# Patient Record
Sex: MALE | Race: WHITE | Employment: FULL TIME | URBAN - METROPOLITAN AREA
[De-identification: names, ages, dates, MRNs, and addresses within clinical notes are randomized per-mention and may not be internally consistent; named-entity substitution may affect disease eponyms.]

---

## 2018-06-27 ENCOUNTER — HOSPITAL ENCOUNTER (EMERGENCY)
Age: 38
Discharge: HOME | End: 2018-06-27
Payer: COMMERCIAL

## 2018-06-27 VITALS
TEMPERATURE: 98.2 F | RESPIRATION RATE: 16 BRPM | OXYGEN SATURATION: 100 % | BODY MASS INDEX: 26.87 KG/M2 | DIASTOLIC BLOOD PRESSURE: 82 MMHG | HEART RATE: 68 BPM | SYSTOLIC BLOOD PRESSURE: 127 MMHG | WEIGHT: 215 LBS

## 2018-06-27 DIAGNOSIS — S05.8X1A ABRASION OF SCLERA OF RIGHT EYE, INITIAL ENCOUNTER: Primary | ICD-10-CM

## 2018-06-27 PROCEDURE — 99212 OFFICE O/P EST SF 10 MIN: CPT

## 2018-06-27 PROCEDURE — 6370000000 HC RX 637 (ALT 250 FOR IP): Performed by: PHYSICIAN ASSISTANT

## 2018-06-27 RX ADMIN — FLUORESCEIN SODIUM 1 MG: 0.6 STRIP OPHTHALMIC at 09:46

## 2020-12-16 ENCOUNTER — APPOINTMENT (OUTPATIENT)
Dept: CT IMAGING | Age: 40
DRG: 177 | End: 2020-12-16
Payer: COMMERCIAL

## 2020-12-16 ENCOUNTER — APPOINTMENT (OUTPATIENT)
Dept: ULTRASOUND IMAGING | Age: 40
DRG: 177 | End: 2020-12-16
Payer: COMMERCIAL

## 2020-12-16 ENCOUNTER — HOSPITAL ENCOUNTER (INPATIENT)
Age: 40
LOS: 2 days | Discharge: HOME | DRG: 177 | End: 2020-12-18
Attending: EMERGENCY MEDICINE | Admitting: INTERNAL MEDICINE
Payer: COMMERCIAL

## 2020-12-16 DIAGNOSIS — M54.50 ACUTE RIGHT-SIDED LOW BACK PAIN, UNSPECIFIED WHETHER SCIATICA PRESENT: ICD-10-CM

## 2020-12-16 DIAGNOSIS — I26.99 PULMONARY EMBOLISM, BILATERAL (HCC): Primary | ICD-10-CM

## 2020-12-16 LAB
ABO/RH: NORMAL
ALBUMIN SERPL-MCNC: 4.1 G/DL (ref 3.5–5.2)
ALP BLD-CCNC: 61 U/L (ref 40–129)
ALT SERPL-CCNC: 20 U/L (ref 0–40)
ANION GAP SERPL CALCULATED.3IONS-SCNC: 10 MMOL/L (ref 7–16)
ANTIBODY SCREEN: NORMAL
APTT: 27 SEC (ref 24.5–35.1)
APTT: 77.9 SEC (ref 24.5–35.1)
AST SERPL-CCNC: 17 U/L (ref 0–39)
BASOPHILS ABSOLUTE: 0.03 E9/L (ref 0–0.2)
BASOPHILS RELATIVE PERCENT: 0.2 % (ref 0–2)
BILIRUB SERPL-MCNC: 0.6 MG/DL (ref 0–1.2)
BUN BLDV-MCNC: 16 MG/DL (ref 6–20)
C-REACTIVE PROTEIN: 7.9 MG/DL (ref 0–0.4)
CALCIUM SERPL-MCNC: 9.3 MG/DL (ref 8.6–10.2)
CHLORIDE BLD-SCNC: 104 MMOL/L (ref 98–107)
CO2: 26 MMOL/L (ref 22–29)
CREAT SERPL-MCNC: 1 MG/DL (ref 0.7–1.2)
D DIMER: 813 NG/ML DDU
EKG ATRIAL RATE: 38 BPM
EKG Q-T INTERVAL: 364 MS
EKG QRS DURATION: 98 MS
EKG QTC CALCULATION (BAZETT): 411 MS
EKG R AXIS: 1 DEGREES
EKG T AXIS: 22 DEGREES
EKG VENTRICULAR RATE: 77 BPM
EOSINOPHILS ABSOLUTE: 0.03 E9/L (ref 0.05–0.5)
EOSINOPHILS RELATIVE PERCENT: 0.2 % (ref 0–6)
FERRITIN: 397 NG/ML
FIBRINOGEN: >700 MG/DL (ref 225–540)
GFR AFRICAN AMERICAN: >60
GFR NON-AFRICAN AMERICAN: >60 ML/MIN/1.73
GLUCOSE BLD-MCNC: 115 MG/DL (ref 74–99)
HCT VFR BLD CALC: 42.2 % (ref 37–54)
HEMOGLOBIN: 14.3 G/DL (ref 12.5–16.5)
IMMATURE GRANULOCYTES #: 0.08 E9/L
IMMATURE GRANULOCYTES %: 0.6 % (ref 0–5)
LACTATE DEHYDROGENASE: 156 U/L (ref 135–225)
LACTIC ACID: 1.6 MMOL/L (ref 0.5–2.2)
LYMPHOCYTES ABSOLUTE: 1 E9/L (ref 1.5–4)
LYMPHOCYTES RELATIVE PERCENT: 7.4 % (ref 20–42)
MCH RBC QN AUTO: 31.2 PG (ref 26–35)
MCHC RBC AUTO-ENTMCNC: 33.9 % (ref 32–34.5)
MCV RBC AUTO: 92.1 FL (ref 80–99.9)
MONOCYTES ABSOLUTE: 1.26 E9/L (ref 0.1–0.95)
MONOCYTES RELATIVE PERCENT: 9.3 % (ref 2–12)
NEUTROPHILS ABSOLUTE: 11.12 E9/L (ref 1.8–7.3)
NEUTROPHILS RELATIVE PERCENT: 82.3 % (ref 43–80)
PDW BLD-RTO: 12.2 FL (ref 11.5–15)
PLATELET # BLD: 307 E9/L (ref 130–450)
PMV BLD AUTO: 8.9 FL (ref 7–12)
POTASSIUM REFLEX MAGNESIUM: 4.7 MMOL/L (ref 3.5–5)
PROCALCITONIN: 0.09 NG/ML (ref 0–0.08)
RBC # BLD: 4.58 E12/L (ref 3.8–5.8)
SEDIMENTATION RATE, ERYTHROCYTE: 59 MM/HR (ref 0–15)
SODIUM BLD-SCNC: 140 MMOL/L (ref 132–146)
TOTAL PROTEIN: <0.2 G/DL (ref 6.4–8.3)
TROPONIN: <0.01 NG/ML (ref 0–0.03)
VITAMIN D 25-HYDROXY: 22 NG/ML (ref 30–100)
WBC # BLD: 13.5 E9/L (ref 4.5–11.5)

## 2020-12-16 PROCEDURE — 36415 COLL VENOUS BLD VENIPUNCTURE: CPT

## 2020-12-16 PROCEDURE — 96375 TX/PRO/DX INJ NEW DRUG ADDON: CPT

## 2020-12-16 PROCEDURE — 85651 RBC SED RATE NONAUTOMATED: CPT

## 2020-12-16 PROCEDURE — 82306 VITAMIN D 25 HYDROXY: CPT

## 2020-12-16 PROCEDURE — 85730 THROMBOPLASTIN TIME PARTIAL: CPT

## 2020-12-16 PROCEDURE — 6360000002 HC RX W HCPCS: Performed by: STUDENT IN AN ORGANIZED HEALTH CARE EDUCATION/TRAINING PROGRAM

## 2020-12-16 PROCEDURE — 1200000000 HC SEMI PRIVATE

## 2020-12-16 PROCEDURE — 86140 C-REACTIVE PROTEIN: CPT

## 2020-12-16 PROCEDURE — 84484 ASSAY OF TROPONIN QUANT: CPT

## 2020-12-16 PROCEDURE — 86901 BLOOD TYPING SEROLOGIC RH(D): CPT

## 2020-12-16 PROCEDURE — 83605 ASSAY OF LACTIC ACID: CPT

## 2020-12-16 PROCEDURE — 85384 FIBRINOGEN ACTIVITY: CPT

## 2020-12-16 PROCEDURE — 85378 FIBRIN DEGRADE SEMIQUANT: CPT

## 2020-12-16 PROCEDURE — 0202U NFCT DS 22 TRGT SARS-COV-2: CPT

## 2020-12-16 PROCEDURE — 82728 ASSAY OF FERRITIN: CPT

## 2020-12-16 PROCEDURE — 96374 THER/PROPH/DIAG INJ IV PUSH: CPT

## 2020-12-16 PROCEDURE — 6360000004 HC RX CONTRAST MEDICATION: Performed by: RADIOLOGY

## 2020-12-16 PROCEDURE — 83615 LACTATE (LD) (LDH) ENZYME: CPT

## 2020-12-16 PROCEDURE — 6360000002 HC RX W HCPCS: Performed by: INTERNAL MEDICINE

## 2020-12-16 PROCEDURE — 2580000003 HC RX 258: Performed by: INTERNAL MEDICINE

## 2020-12-16 PROCEDURE — 6370000000 HC RX 637 (ALT 250 FOR IP): Performed by: INTERNAL MEDICINE

## 2020-12-16 PROCEDURE — 93010 ELECTROCARDIOGRAM REPORT: CPT | Performed by: INTERNAL MEDICINE

## 2020-12-16 PROCEDURE — 84145 PROCALCITONIN (PCT): CPT

## 2020-12-16 PROCEDURE — 86900 BLOOD TYPING SEROLOGIC ABO: CPT

## 2020-12-16 PROCEDURE — 83520 IMMUNOASSAY QUANT NOS NONAB: CPT

## 2020-12-16 PROCEDURE — 93970 EXTREMITY STUDY: CPT

## 2020-12-16 PROCEDURE — 87449 NOS EACH ORGANISM AG IA: CPT

## 2020-12-16 PROCEDURE — 99285 EMERGENCY DEPT VISIT HI MDM: CPT

## 2020-12-16 PROCEDURE — 86850 RBC ANTIBODY SCREEN: CPT

## 2020-12-16 PROCEDURE — 85025 COMPLETE CBC W/AUTO DIFF WBC: CPT

## 2020-12-16 PROCEDURE — 80053 COMPREHEN METABOLIC PANEL: CPT

## 2020-12-16 PROCEDURE — 71275 CT ANGIOGRAPHY CHEST: CPT

## 2020-12-16 PROCEDURE — 93005 ELECTROCARDIOGRAM TRACING: CPT | Performed by: STUDENT IN AN ORGANIZED HEALTH CARE EDUCATION/TRAINING PROGRAM

## 2020-12-16 RX ORDER — AZITHROMYCIN 250 MG/1
500 TABLET, FILM COATED ORAL DAILY
Status: DISCONTINUED | OUTPATIENT
Start: 2020-12-17 | End: 2020-12-18 | Stop reason: HOSPADM

## 2020-12-16 RX ORDER — HEPARIN SODIUM 1000 [USP'U]/ML
80 INJECTION, SOLUTION INTRAVENOUS; SUBCUTANEOUS ONCE
Status: COMPLETED | OUTPATIENT
Start: 2020-12-16 | End: 2020-12-16

## 2020-12-16 RX ORDER — HEPARIN SODIUM 1000 [USP'U]/ML
80 INJECTION, SOLUTION INTRAVENOUS; SUBCUTANEOUS PRN
Status: DISCONTINUED | OUTPATIENT
Start: 2020-12-16 | End: 2020-12-18 | Stop reason: HOSPADM

## 2020-12-16 RX ORDER — FENTANYL CITRATE 50 UG/ML
75 INJECTION, SOLUTION INTRAMUSCULAR; INTRAVENOUS ONCE
Status: COMPLETED | OUTPATIENT
Start: 2020-12-16 | End: 2020-12-16

## 2020-12-16 RX ORDER — FENTANYL CITRATE 50 UG/ML
50 INJECTION, SOLUTION INTRAMUSCULAR; INTRAVENOUS ONCE
Status: DISCONTINUED | OUTPATIENT
Start: 2020-12-16 | End: 2020-12-18 | Stop reason: HOSPADM

## 2020-12-16 RX ORDER — ASCORBIC ACID 500 MG
1000 TABLET ORAL DAILY
Status: DISCONTINUED | OUTPATIENT
Start: 2020-12-16 | End: 2020-12-18 | Stop reason: HOSPADM

## 2020-12-16 RX ORDER — POLYETHYLENE GLYCOL 3350 17 G/17G
17 POWDER, FOR SOLUTION ORAL DAILY PRN
Status: DISCONTINUED | OUTPATIENT
Start: 2020-12-16 | End: 2020-12-18 | Stop reason: HOSPADM

## 2020-12-16 RX ORDER — HEPARIN SODIUM 1000 [USP'U]/ML
40 INJECTION, SOLUTION INTRAVENOUS; SUBCUTANEOUS PRN
Status: DISCONTINUED | OUTPATIENT
Start: 2020-12-16 | End: 2020-12-18 | Stop reason: HOSPADM

## 2020-12-16 RX ORDER — ALLOPURINOL 300 MG/1
300 TABLET ORAL DAILY
COMMUNITY

## 2020-12-16 RX ORDER — HEPARIN SODIUM 10000 [USP'U]/100ML
18 INJECTION, SOLUTION INTRAVENOUS CONTINUOUS
Status: DISCONTINUED | OUTPATIENT
Start: 2020-12-16 | End: 2020-12-18 | Stop reason: HOSPADM

## 2020-12-16 RX ORDER — OXYCODONE HYDROCHLORIDE AND ACETAMINOPHEN 5; 325 MG/1; MG/1
1 TABLET ORAL EVERY 8 HOURS PRN
Status: DISCONTINUED | OUTPATIENT
Start: 2020-12-16 | End: 2020-12-18 | Stop reason: HOSPADM

## 2020-12-16 RX ORDER — VITAMIN B COMPLEX
2000 TABLET ORAL DAILY
Status: DISCONTINUED | OUTPATIENT
Start: 2020-12-16 | End: 2020-12-17

## 2020-12-16 RX ORDER — ACETAMINOPHEN 650 MG/1
650 SUPPOSITORY RECTAL EVERY 6 HOURS PRN
Status: DISCONTINUED | OUTPATIENT
Start: 2020-12-16 | End: 2020-12-18 | Stop reason: HOSPADM

## 2020-12-16 RX ORDER — GAUZE BANDAGE 2" X 2"
100 BANDAGE TOPICAL DAILY
Status: DISCONTINUED | OUTPATIENT
Start: 2020-12-16 | End: 2020-12-18 | Stop reason: HOSPADM

## 2020-12-16 RX ORDER — SODIUM CHLORIDE 0.9 % (FLUSH) 0.9 %
10 SYRINGE (ML) INJECTION PRN
Status: DISCONTINUED | OUTPATIENT
Start: 2020-12-16 | End: 2020-12-18 | Stop reason: HOSPADM

## 2020-12-16 RX ORDER — LANOLIN ALCOHOL/MO/W.PET/CERES
50 CREAM (GRAM) TOPICAL DAILY
Status: DISCONTINUED | OUTPATIENT
Start: 2020-12-16 | End: 2020-12-18 | Stop reason: HOSPADM

## 2020-12-16 RX ORDER — SODIUM CHLORIDE 0.9 % (FLUSH) 0.9 %
10 SYRINGE (ML) INJECTION EVERY 12 HOURS SCHEDULED
Status: DISCONTINUED | OUTPATIENT
Start: 2020-12-16 | End: 2020-12-18 | Stop reason: HOSPADM

## 2020-12-16 RX ORDER — ACETAMINOPHEN 325 MG/1
650 TABLET ORAL EVERY 6 HOURS PRN
Status: DISCONTINUED | OUTPATIENT
Start: 2020-12-16 | End: 2020-12-18 | Stop reason: HOSPADM

## 2020-12-16 RX ORDER — ZINC SULFATE 50(220)MG
50 CAPSULE ORAL DAILY
Status: DISCONTINUED | OUTPATIENT
Start: 2020-12-16 | End: 2020-12-18 | Stop reason: HOSPADM

## 2020-12-16 RX ADMIN — SODIUM CHLORIDE, PRESERVATIVE FREE 10 ML: 5 INJECTION INTRAVENOUS at 20:42

## 2020-12-16 RX ADMIN — PYRIDOXINE HCL TAB 50 MG 50 MG: 50 TAB at 18:48

## 2020-12-16 RX ADMIN — IOPAMIDOL 75 ML: 755 INJECTION, SOLUTION INTRAVENOUS at 13:03

## 2020-12-16 RX ADMIN — OXYCODONE HYDROCHLORIDE AND ACETAMINOPHEN 1000 MG: 500 TABLET ORAL at 18:48

## 2020-12-16 RX ADMIN — HEPARIN SODIUM 18 UNITS/KG/HR: 10000 INJECTION, SOLUTION INTRAVENOUS at 13:50

## 2020-12-16 RX ADMIN — HEPARIN SODIUM 7940 UNITS: 1000 INJECTION INTRAVENOUS; SUBCUTANEOUS at 13:50

## 2020-12-16 RX ADMIN — ZINC SULFATE 220 MG (50 MG) CAPSULE 50 MG: CAPSULE at 18:48

## 2020-12-16 RX ADMIN — AZITHROMYCIN DIHYDRATE 500 MG: 500 INJECTION, POWDER, LYOPHILIZED, FOR SOLUTION INTRAVENOUS at 20:41

## 2020-12-16 RX ADMIN — Medication 100 MG: at 18:48

## 2020-12-16 RX ADMIN — FENTANYL CITRATE 75 MCG: 50 INJECTION INTRAMUSCULAR; INTRAVENOUS at 10:55

## 2020-12-16 RX ADMIN — WATER 1 G: 1 INJECTION INTRAMUSCULAR; INTRAVENOUS; SUBCUTANEOUS at 20:41

## 2020-12-16 RX ADMIN — Medication 2000 UNITS: at 18:47

## 2020-12-16 ASSESSMENT — ENCOUNTER SYMPTOMS
SINUS PRESSURE: 0
ABDOMINAL PAIN: 0
COUGH: 0
EYE REDNESS: 0
EYE DISCHARGE: 0
BACK PAIN: 1
DIARRHEA: 0
ABDOMINAL DISTENTION: 0
NAUSEA: 0
CONSTIPATION: 0
SHORTNESS OF BREATH: 1
VOMITING: 0
WHEEZING: 0
EYE PAIN: 0
SORE THROAT: 0

## 2020-12-16 ASSESSMENT — PAIN SCALES - GENERAL: PAINLEVEL_OUTOF10: 10

## 2020-12-16 NOTE — CARE COORDINATION
Emergency Department Social Work Assessment:    Pt presents to the ED with back pain and COVID19. SW met with pt in room to discuss transition of care and complete ACP. Pt was alert/oriented, stated that he lives with his ezekiel Cruz ([phone]), who is he would want to be his primary decision maker. SW discussed the benefits of Advanced Directives, he was receptive to the information but did not want to complete paperwork today. Pt reported that he works full time, was independent PTA, plans on returning home when medically stable, no discharge needs identified. ACP completed. Assigned SW/CM to follow.     SEFERINO Martinez, [address] Emergency Department

## 2020-12-16 NOTE — H&P
Department of Internal Medicine  History and Physical    PCP: Dr. Nuria Ribeiro    Admitting Physician: Dr. Jodi Hayes  Consultants:       CHIEF COMPLAINT: Right-sided flank pain    HISTORY OF PRESENT ILLNESS:    Patient is 40-year-old male who presented to the ED due to right sided flank pain. Patient states that he had this for the last few days. Due to the pain he does have issues with his breathing. He is not able to take deep breaths without pain. Patient recently tested positive for COVID-19 on 12/8/2020. His wife had contracted COVID-19 and as a result he was tested. He denies any symptoms from COVID-19. He denies fever or chills. Denies nausea or vomiting. He denies loss of smell or taste. He denies any lower extremity or upper extremity swelling. He denies chest pain. Family history is significant for coronary artery disease. His father had CABG in his 46s. He does chew tobacco.  He does have a history of tobacco abuse. He does occasionally drink alcohol. Denies any recent travel or procedures. PAST MEDICAL Hx:  History reviewed. No pertinent past medical history. PAST SURGICAL Hx:   History reviewed. No pertinent surgical history. FAMILY Hx:  History reviewed. No pertinent family history. HOME MEDICATIONS:  Prior to Admission medications    Medication Sig Start Date End Date Taking? Authorizing Provider   allopurinol (ZYLOPRIM) 300 MG tablet Take 300 mg by mouth daily   Yes Historical Provider, MD       ALLERGIES:  Patient has no known allergies.     SOCIAL Hx:  Social History     Socioeconomic History    Marital status: Single     Spouse name: Not on file    Number of children: Not on file    Years of education: Not on file    Highest education level: Not on file   Occupational History    Not on file   Social Needs    Financial resource strain: Not on file    Food insecurity     Worry: Not on file     Inability: Not on file    Transportation needs     Medical: Not on file Non-medical: Not on file   Tobacco Use    Smoking status: Former Smoker     Packs/day: 0.50     Quit date: 2016     Years since quittin.4   Substance and Sexual Activity    Alcohol use: Yes     Comment: occ    Drug use: No    Sexual activity: Not on file   Lifestyle    Physical activity     Days per week: Not on file     Minutes per session: Not on file    Stress: Not on file   Relationships    Social connections     Talks on phone: Not on file     Gets together: Not on file     Attends Worship service: Not on file     Active member of club or organization: Not on file     Attends meetings of clubs or organizations: Not on file     Relationship status: Not on file    Intimate partner violence     Fear of current or ex partner: Not on file     Emotionally abused: Not on file     Physically abused: Not on file     Forced sexual activity: Not on file   Other Topics Concern    Not on file   Social History Narrative    Not on file       ROS: Positive in bold  General:   Denies chills, fatigue, fever, malaise, night sweats or weight loss    Psychological:   Denies anxiety, disorientation or hallucinations    ENT:    Denies epistaxis, headaches, vertigo or visual changes    Cardiovascular:   Denies any chest pain, irregular heartbeats, or palpitations. No paroxysmal nocturnal dyspnea. Respiratory:   Denies shortness of breath, coughing, sputum production, hemoptysis, or wheezing. No orthopnea. Gastrointestinal:   Denies nausea, vomiting, diarrhea, or constipation. Denies any abdominal pain. Denies change in bowel habits or stools. Genito-Urinary:    Denies any urgency, frequency, hematuria. Voiding without difficulty. Musculoskeletal:   Denies joint pain, joint stiffness, joint swelling or muscle pain    Neurology:    Denies any headache or focal neurological deficits. No weakness or paresthesia. Derm:    Denies any rashes, ulcers, or excoriations. Denies bruising. Extremities:   Denies any lower extremity swelling or edema. PHYSICAL EXAM:  VITALS:  Vitals:    12/16/20 1603   BP: (!) 143/77   Pulse: 86   Resp: 22   Temp:    SpO2: 97%         CONSTITUTIONAL:    Awake, alert, cooperative, no apparent distress, and appears stated age    EYES:    PERRL, EOMI, sclera clear, conjunctiva normal    ENT:    Normocephalic, atraumatic, sinuses nontender on palpation. External ears without lesions. Oral pharynx with moist mucus membranes. Tonsils without erythema or exudates. NECK:    Supple, symmetrical, trachea midline, no adenopathy, thyroid symmetric, not enlarged and no tenderness, skin normal, no bruits, no JVD    HEMATOLOGIC/LYMPHATICS:    No cervical lymphadenopathy and no supraclavicular lymphadenopathy    LUNGS:    Symmetric. No increased work of breathing, good air exchange, clear to auscultation bilaterally, no wheezes, rhonchi, or rales,     CARDIOVASCULAR:    Normal apical impulse, regular rate and rhythm, normal S1 and S2, no S3 or S4, and no murmur noted    ABDOMEN:    No scars, normal bowel sounds, soft, non-distended, non-tender, no masses palpated, no hepatosplenomegaly, no rebound or guarding elicited on palpation     MUSCULOSKELETAL:    There is no redness, warmth, or swelling of the joints. Full range of motion noted. Motor strength is 5 out of 5 all extremities bilaterally. Tone is normal.    NEUROLOGIC:    Awake, alert, oriented to name, place and time. Cranial nerves II-XII are grossly intact. Motor is 5 out of 5 bilaterally. SKIN:    No bruising or bleeding. No redness, warmth, or swelling    EXTREMITIES:    Peripheral pulses present. No edema, cyanosis, or swelling. OSTEOPATHIC:    Examined in seated and supine positions. Normal thoracic kyphosis and lumbar lordosis. No acute somatic dysfunction.     LINES/CATHETERS     LABORATORY DATA:  CBC with Differential:    Lab Results   Component Value Date    WBC 13.5 12/16/2020    RBC 4.58 12/16/2020    HGB 14.3 12/16/2020    HCT 42.2 12/16/2020     12/16/2020    MCV 92.1 12/16/2020    MCH 31.2 12/16/2020    MCHC 33.9 12/16/2020    RDW 12.2 12/16/2020    LYMPHOPCT 7.4 12/16/2020    MONOPCT 9.3 12/16/2020    BASOPCT 0.2 12/16/2020    MONOSABS 1.26 12/16/2020    LYMPHSABS 1.00 12/16/2020    EOSABS 0.03 12/16/2020    BASOSABS 0.03 12/16/2020     CMP:    Lab Results   Component Value Date     12/16/2020    K 4.7 12/16/2020     12/16/2020    CO2 26 12/16/2020    BUN 16 12/16/2020    CREATININE 1.0 12/16/2020    GFRAA >60 12/16/2020    LABGLOM >60 12/16/2020    GLUCOSE 115 12/16/2020    PROT <0.2 12/16/2020    LABALBU 4.1 12/16/2020    CALCIUM 9.3 12/16/2020    BILITOT 0.6 12/16/2020    ALKPHOS 61 12/16/2020    AST 17 12/16/2020    ALT 20 12/16/2020       ASSESSMENT/PLAN:  1. Bilateral PE most notable in lower lobes with concern for right heart strain. Likely provoked by COVID-19 infection. 1. Patient started on heparin drip. He can be transitioned to Eliquis or Xarelto. Echocardiogram will be obtained. Bilateral lower extremity ultrasound will be obtained. Hematology and oncology will be consulted. 2. COVID-19  1. Patient was diagnosed on 12/8/2020. Overall he is asymptomatic except for pain on the right side with breathing deeply. We will place him on supplements. Will obtain COVID-19 biomarkers. On room air he is saturating in normal limits. Check his saturations on ambulation. 3. Possible pneumonia bacterial versus viral  1. Obtain cultures. Start on antibiotics for now. 4. History of tobacco abuse. 5. Current use of chewing tobacco  6. Family history of premature coronary artery disease          Brandi Bhatt D.O.  5:33 PM  12/16/2020    Electronically signed by Brandi Bhatt DO on 12/16/20 at 5:33 PM EST  Telehealth visit completed via physician liaison, Dr. Annel Thurston,  who personally saw and examined the patient.  I personally participated in the case including review of pertinent history as augmented in the above medical record and medical decision making on the date of service and I agree with all pertinent clinical formation unless otherwise noted.     Mark Paulson D.O., [email or web address].  12/16/2020  6:42 PM

## 2020-12-16 NOTE — ACP (ADVANCE CARE PLANNING)
Advance Care Planning     Advance Care Planning Activator (Inpatient)  Conversation Note      Date of ACP Conversation: 12/16/2020    Conversation Conducted with: Patient with Decision Making Capacity    ACP Activator: [id number] makes decisions on behalf of the incapacitated patient: Decision Maker is asked to consider and make decisions based on patient values, known preferences, or best interests. Health Care Decision Maker:     Current Designated Health Care Decision Maker:   Primary Decision Maker: Sai Alejandro - Other - [phone]  (If there is a valid Health Care Decision Maker named in the \"Healthcare Decision Makers\" box in the ACP activity, but it is not visible above, be sure to open that field and then select the health care decision maker relationship (ie \"primary\") in the blank space to the right of the name.) Validate  this information as still accurate & up-to-date; edit [place] field as needed.)      Care Preferences    Ventilation: \"If you were in your present state of health and suddenly became very ill and were unable to breathe on your own, what would your preference be about the use of a ventilator (breathing machine) if it were available to you? \"      Would the patient desire the use of ventilator (breathing machine)?: yes    \"If your health worsens and it becomes clear that your chance of recovery is unlikely, what would your preference be about the use of a ventilator (breathing machine) if it were available to you? \"     Would the patient desire the use of ventilator (breathing machine)?: Yes      Resuscitation  \"CPR works best to restart the heart when there is a sudden event, like a heart attack, in someone who is otherwise healthy. Unfortunately, CPR does not typically restart the heart for people who have serious health conditions or who are very sick. \"    \"In the event your heart stopped as a result of an underlying serious health condition, would you want attempts to be made to restart your heart (answer \"yes\" for attempt to resuscitate) or would you prefer a natural death (answer \"no\" for do not attempt to resuscitate)? \" yes      NOTE: If the patient has a valid advance directive AND now provides care preference(s) that are inconsistent with that prior directive, advise the patient to consider either: creating a new advance directive that complies with state-specific requirements; or, if that is not possible, orally revoking that prior directive in accordance with state-specific requirements, which must be documented in the EHR. [] Yes   [x] No   Educated Patient / Tsjeffreye Yesseniaa regarding differences between Advance Directives and portable DNR orders.     Length of ACP Conversation in minutes:      Conversation Outcomes:  [x] ACP discussion completed  [] Existing advance directive reviewed with patient; no changes to patient's previously recorded wishes  [] New Advance Directive completed  [] Portable Do Not Rescitate prepared for Provider review and signature  [] POLST/POST/MOLST/MOST prepared for Provider review and signature      Follow-up plan:    [x] Schedule follow-up conversation to continue planning  [] Referred individual to Provider for additional questions/concerns   [] Advised patient/agent/surrogate to review completed ACP document and update if needed with changes in condition, patient preferences or care setting    [x] This note routed to one or more involved healthcare providers       SEFERINO Gunter, [address] Emergency Department

## 2020-12-16 NOTE — ED PROVIDER NOTES
39-year-old male positive Covid history presents the ED with complaint of right lower quadrant back pain from his primary care doctor's office Dr. Martin Prado. Patient did receive Toradol while at his primary care doctor's office however patient is still complaining of pain. Patient states that the lower back pain for approximately 3 days described as constant and sharp. [place] has been worsening over the past 3 days nothing makes it  better , has not tried anything except for the Toradol he got at his primary care doctor's office. The pain does not radiate stays in the right lower back and does not move. States pain is worse with deep breathing in the right lower back. Dates he is short of breath due to the pain. Denies any chest pain, abdominal pain denies any changes to bowel or bladder habits denies any fevers, chills. The history is provided by the patient and medical records. Review of Systems   Constitutional: Negative for chills and fever. HENT: Negative for ear pain, sinus pressure and sore throat. Eyes: Negative for pain, discharge and redness. Respiratory: Positive for shortness of breath. Negative for cough and wheezing. Cardiovascular: Negative for chest pain. Gastrointestinal: Negative for abdominal distention, abdominal pain, constipation, diarrhea, nausea and vomiting. Genitourinary: Negative for dysuria and frequency. Musculoskeletal: Positive for back pain. Negative for arthralgias. Skin: Negative for rash and wound. Neurological: Negative for weakness and headaches. Hematological: Negative for adenopathy. All other systems reviewed and are negative. Physical Exam  Vitals signs and nursing note reviewed. Constitutional:       Appearance: He is well-developed. HENT:      Head: Normocephalic and atraumatic. Eyes:      General: No scleral icterus.      Conjunctiva/sclera: Conjunctivae normal.   Neck:      Musculoskeletal: Normal range of motion and neck supple. Cardiovascular:      Rate and Rhythm: Normal rate and regular rhythm. Heart sounds: Normal heart sounds. No murmur. Pulmonary:      Effort: Pulmonary effort is normal. No respiratory distress. Breath sounds: Normal breath sounds. No wheezing or rales. Abdominal:      General: Bowel sounds are normal. There is no distension. Palpations: Abdomen is soft. Tenderness: There is no abdominal tenderness. There is no right CVA tenderness, left CVA tenderness, guarding or rebound. Musculoskeletal:         General: No swelling, tenderness or deformity. Comments: No pain to palpation in the patient's right lower back or along his lumbar thoracic or cervical spine. No signs of any step-offs or deformities, no signs of any injury in the lower back. Skin:     General: Skin is warm and dry. Coloration: Skin is not jaundiced. Neurological:      General: No focal deficit present. Mental Status: He is alert and oriented to person, place, and time. Cranial Nerves: No cranial nerve deficit. Coordination: Coordination normal.   Psychiatric:         Mood and Affect: Mood normal.         Thought Content: Thought content normal.          Procedures     LIMITED ABDOMINAL BEDSIDE ULTRASOUND     Risks, benefits and alternatives (for applicable procedures below) described. Performed By: Vikas Gabriel MD.    Indication:  Pain. Informed consent: The patient provided verbal consent for this procedure. .  Procedural Quadrants:     Ultrasound of the patient's right kidney revealed no acute hydronephrosis         MDM  Number of Diagnoses or Management Options  Acute right-sided low back pain, unspecified whether sciatica present  Pulmonary embolism, bilateral ([address]  Diagnosis management comments: 13-year-old male Covid positive presents ED with complaint of right lower back pain sent in from Dr. Nuria Ribeiro office. Patient was given IM Toradol and Dr. Irene Villatoro office as well.   She had no complaint of chest pain or shortness of breath but did have pain with deep inspiration. Due to this did get a CT scan of the patient's chest along with cardiac work-up. Patient did have improved pain control following fentanyl here in the emergency department today but the Toradol given at Dr. Martin Prado office did not help his pain. Did also perform ultrasound of patient's right-sided kidney check for possible hydronephrosis, no hydronephrosis seen on ultrasound. Patient's EKG was unremarkable for any acute ST elevations or depressions, troponin was within normal limits low concern cardiac etiology at this point. Remaining lab work was unremarkable. Patient's CAT scan check for possible pulmonary embolism did reveal bilateral PEs with possible right heart strain. Due to this decision was made to admit the patient. Was agreeable to be admitted this time. Did also start the patient on heparin while here in the emergency department. Patient remained hemodynamically stable while here in the department. ED Course as of Dec 16 1331   Wed Dec 16, 2020   1113   ATTENDING PROVIDER ATTESTATION:     I have personally performed and/or participated in the history, exam, medical decision making, and procedures and agree with all pertinent clinical information unless otherwise noted. I have also reviewed and agree with the past medical, family and social history unless otherwise noted. I have discussed this patient in detail with the resident and provided the instruction and education regarding the evidence-based evaluation and treatment of [unfilled]  History: patient presents from Dr Martin Prado office. He has COVID and was being evaluated. Prior to leaving he developed right sided abdominal pain/lower posterior chest wall pain and began to cry. Dr Martin Prado gave him Toradol 60 mg. Patient states no improvement in symptoms. It hurts to take a deep breath. My findings: Dennis Pollard is a 41 y.o. male whom is in mild distress. Physical exam reveals heart RRR, lungs CTA, abdomen is soft and nontender. No CVA tenderness. No rash appreciated. No peripheral edema or tenderness. My plan: Symptomatic and supportive care. Patient to have CTA and further evaluation. Electronically signed by Jenny Bradford DO on 12/16/20 at 11:13 AM EST        [JS]   1330 Updated patient on his lab work and his imaging showing bilateral pulmonary embolisms. Need for admission due to this and was started on heparin as well patient was agreeable this plan moving forward.    [CB]      ED Course User Index  [CB] Rabia Hernandez MD  [JS] Jenny Bradford DO        EKG: This EKG is signed by emergency department physician. Rate: 77  Rhythm: Sinus  Interpretation: No previous EKG to compare to, normal sinus rhythm, no acute ST elevations or depressions, normal axis  Comparison: no previous EKG available       ED Course as of Dec 16 1331   Wed Dec 16, 2020   1113   ATTENDING PROVIDER ATTESTATION:     I have personally performed and/or participated in the history, exam, medical decision making, and procedures and agree with all pertinent clinical information unless otherwise noted. I have also reviewed and agree with the past medical, family and social history unless otherwise noted. I have discussed this patient in detail with the resident and provided the instruction and education regarding the evidence-based evaluation and treatment of [unfilled]  History: patient presents from Dr Keisha Christensen office. He has COVID and was being evaluated. Prior to leaving he developed right sided abdominal pain/lower posterior chest wall pain and began to cry. Dr Keisha Christensen gave him Toradol 60 mg. Patient states no improvement in symptoms. It hurts to take a deep breath. My findings: Roberto Martínez is a 41 y.o. male whom is in mild distress.  Physical exam reveals heart RRR, lungs CTA, abdomen is soft and nontender. No CVA tenderness. No rash appreciated. No peripheral edema or tenderness. My plan: Symptomatic and supportive care. Patient to have CTA and further evaluation. Electronically signed by Jet Salamanca DO on 12/16/20 at 11:13 AM EST        [JS]   0617 Updated patient on his lab work and his imaging showing bilateral pulmonary embolisms. Need for admission due to this and was started on heparin as well patient was agreeable this plan moving forward.    [CB]      ED Course User Index  [CB] Mariana Calhoun MD  [JS] Jet Salamanca DO       --------------------------------------------- PAST HISTORY ---------------------------------------------  Past Medical History:  has no past medical history on file. Past Surgical History:  has no past surgical history on file. Social History:  reports that he quit smoking about 4 years ago. He smoked 0.50 packs per day. He does not have any smokeless tobacco history on file. He reports current alcohol use. He reports that he does not use drugs. Family History: family history is not on file. The patients home medications have been reviewed. Allergies: Patient has no known allergies.     -------------------------------------------------- RESULTS -------------------------------------------------    LABS:  Results for orders placed or performed during the hospital encounter of 12/16/20   CBC Auto Differential   Result Value Ref Range    WBC 13.5 (H) 4.5 - 11.5 E9/L    RBC 4.58 3.80 - 5.80 E12/L    Hemoglobin 14.3 12.5 - 16.5 g/dL    Hematocrit 42.2 37.0 - 54.0 %    MCV 92.1 80.0 - 99.9 fL    MCH 31.2 26.0 - 35.0 pg    MCHC 33.9 32.0 - 34.5 %    RDW 12.2 11.5 - 15.0 fL    Platelets 459 225 - 924 E9/L    MPV 8.9 7.0 - 12.0 fL    Neutrophils % 82.3 (H) 43.0 - 80.0 %    Immature Granulocytes % 0.6 0.0 - 5.0 %    Lymphocytes % 7.4 (L) 20.0 - 42.0 %    Monocytes % 9.3 2.0 - 12.0 %    Eosinophils % 0.2 0.0 - 6.0 %    Basophils % 0.2 0.0 - 2.0 %    Neutrophils Absolute 11.12 (H) 1.80 - 7.30 E9/L    Immature Granulocytes # 0.08 E9/L    Lymphocytes Absolute 1.00 (L) 1.50 - 4.00 E9/L    Monocytes Absolute 1.26 (H) 0.10 - 0.95 E9/L    Eosinophils Absolute 0.03 (L) 0.05 - 0.50 E9/L    Basophils Absolute 0.03 0.00 - 0.20 E9/L   Comprehensive Metabolic Panel w/ Reflex to MG   Result Value Ref Range    Sodium 140 132 - 146 mmol/L    Potassium reflex Magnesium 4.7 3.5 - 5.0 mmol/L    Chloride 104 98 - 107 mmol/L    CO2 26 22 - 29 mmol/L    Anion Gap 10 7 - 16 mmol/L    Glucose 115 (H) 74 - 99 mg/dL    BUN 16 6 - 20 mg/dL    CREATININE 1.0 0.7 - 1.2 mg/dL    GFR Non-African American >60 >=60 mL/min/1.73    GFR African American >60     Calcium 9.3 8.6 - 10.2 mg/dL    Total Protein <0.2 (L) 6.4 - 8.3 g/dL    Alb 4.1 3.5 - 5.2 g/dL    Total Bilirubin 0.6 0.0 - 1.2 mg/dL    Alkaline Phosphatase 61 40 - 129 U/L    ALT 20 0 - 40 U/L    AST 17 0 - 39 U/L   Troponin   Result Value Ref Range    Troponin <0.01 0.00 - 0.03 ng/mL   EKG 12 Lead   Result Value Ref Range    Ventricular Rate 77 BPM    Atrial Rate 38 BPM    QRS Duration 98 ms    Q-T Interval 364 ms    QTc Calculation (Bazett) 411 ms    R Axis 1 degrees    T Axis 22 degrees       RADIOLOGY:  CTA PULMONARY W CONTRAST   Final Result   1. Bilateral pulmonary emboli, most notable in the lower lobes. 2. Mild dilation of the main pulmonary artery and flattening of the   interventricular septum raise concern for right heart strain. 3. Pulmonary opacity in the right lower lobe may represent an   infectious/inflammatory process or embolism-related infarction.    The findings were submitted to the Radiology Results [address] at   13:19 on 12/16/2020  to in be communicated to a licensed caregiver.                ------------------------- NURSING NOTES AND VITALS REVIEWED ---------------------------  Date / Time Roomed:  12/16/2020 10:08 AM  ED Bed Assignment:  07/07    The nursing notes within the ED encounter and vital signs as below have been reviewed. Patient Vitals for the past 24 hrs:   BP Temp Temp src Pulse Resp SpO2 Height Weight   12/16/20 1203 133/77 -- -- 66 18 96 % -- --   12/16/20 1113 (!) 143/84 -- -- -- -- -- -- --   12/16/20 0956 -- 97.1 °F (36.2 °C) Tympanic 97 18 98 % 6' 3\" (1.905 m) 219 lb (99.3 kg)       Oxygen Saturation Interpretation: Normal    ------------------------------------------ PROGRESS NOTES ------------------------------------------  Re-evaluation(s):  Time: 1330  Patients symptoms show no change  Repeat physical examination is not changed    Counseling:  I have spoken with the patient and discussed todays results, in addition to providing specific details for the plan of care and counseling regarding the diagnosis and prognosis. Their questions are answered at this time and they are agreeable with the plan of admission.    --------------------------------- ADDITIONAL PROVIDER NOTES ---------------------------------  Consultations:  Time: 1356. Spoke with Dr. Diamond Torrez. Discussed case. They will admit the patient. This patient's ED course included: a personal history and physicial examination, re-evaluation prior to disposition, multiple bedside re-evaluations, IV medications, cardiac monitoring and continuous pulse oximetry    This patient has remained hemodynamically stable during their ED course. Diagnosis:  1. Pulmonary embolism, bilateral ([address]    2. Acute right-sided low back pain, unspecified whether sciatica present        Disposition:  Patient's disposition: Admit to telemetry  Patient's condition is stable.          Jason Prince MD  Resident  12/16/20 8931

## 2020-12-17 LAB
ADENOVIRUS BY PCR: NOT DETECTED
ALBUMIN SERPL-MCNC: 3.7 G/DL (ref 3.5–5.2)
ALP BLD-CCNC: 57 U/L (ref 40–129)
ALT SERPL-CCNC: 19 U/L (ref 0–40)
ANION GAP SERPL CALCULATED.3IONS-SCNC: 9 MMOL/L (ref 7–16)
APTT: 102.2 SEC (ref 24.5–35.1)
APTT: 60.2 SEC (ref 24.5–35.1)
APTT: 61.9 SEC (ref 24.5–35.1)
APTT: 69.7 SEC (ref 24.5–35.1)
AST SERPL-CCNC: 16 U/L (ref 0–39)
BASOPHILS ABSOLUTE: 0.05 E9/L (ref 0–0.2)
BASOPHILS RELATIVE PERCENT: 0.4 % (ref 0–2)
BILIRUB SERPL-MCNC: 1 MG/DL (ref 0–1.2)
BORDETELLA PARAPERTUSSIS BY PCR: NOT DETECTED
BORDETELLA PERTUSSIS BY PCR: NOT DETECTED
BUN BLDV-MCNC: 14 MG/DL (ref 6–20)
CALCIUM SERPL-MCNC: 8.8 MG/DL (ref 8.6–10.2)
CHLAMYDOPHILIA PNEUMONIAE BY PCR: NOT DETECTED
CHLORIDE BLD-SCNC: 102 MMOL/L (ref 98–107)
CHOLESTEROL, TOTAL: 144 MG/DL (ref 0–199)
CO2: 25 MMOL/L (ref 22–29)
CORONAVIRUS 229E BY PCR: NOT DETECTED
CORONAVIRUS HKU1 BY PCR: NOT DETECTED
CORONAVIRUS NL63 BY PCR: NOT DETECTED
CORONAVIRUS OC43 BY PCR: NOT DETECTED
CREAT SERPL-MCNC: 1 MG/DL (ref 0.7–1.2)
D DIMER: 792 NG/ML DDU
EOSINOPHILS ABSOLUTE: 0.14 E9/L (ref 0.05–0.5)
EOSINOPHILS RELATIVE PERCENT: 1.1 % (ref 0–6)
FIBRINOGEN: >700 MG/DL (ref 225–540)
GFR AFRICAN AMERICAN: >60
GFR NON-AFRICAN AMERICAN: >60 ML/MIN/1.73
GLUCOSE BLD-MCNC: 103 MG/DL (ref 74–99)
HBA1C MFR BLD: 5.7 % (ref 4–5.6)
HCT VFR BLD CALC: 39.4 % (ref 37–54)
HDLC SERPL-MCNC: 39 MG/DL
HEMOGLOBIN: 13.2 G/DL (ref 12.5–16.5)
HUMAN METAPNEUMOVIRUS BY PCR: NOT DETECTED
HUMAN RHINOVIRUS/ENTEROVIRUS BY PCR: NOT DETECTED
IMMATURE GRANULOCYTES #: 0.06 E9/L
IMMATURE GRANULOCYTES %: 0.5 % (ref 0–5)
INFLUENZA A BY PCR: NOT DETECTED
INFLUENZA B BY PCR: NOT DETECTED
INR BLD: 1.2
LDL CHOLESTEROL CALCULATED: 90 MG/DL (ref 0–99)
LYMPHOCYTES ABSOLUTE: 2.27 E9/L (ref 1.5–4)
LYMPHOCYTES RELATIVE PERCENT: 18 % (ref 20–42)
MAGNESIUM: 2 MG/DL (ref 1.6–2.6)
MCH RBC QN AUTO: 31.2 PG (ref 26–35)
MCHC RBC AUTO-ENTMCNC: 33.5 % (ref 32–34.5)
MCV RBC AUTO: 93.1 FL (ref 80–99.9)
MONOCYTES ABSOLUTE: 1.28 E9/L (ref 0.1–0.95)
MONOCYTES RELATIVE PERCENT: 10.2 % (ref 2–12)
MYCOPLASMA PNEUMONIAE BY PCR: NOT DETECTED
NEUTROPHILS ABSOLUTE: 8.8 E9/L (ref 1.8–7.3)
NEUTROPHILS RELATIVE PERCENT: 69.8 % (ref 43–80)
PARAINFLUENZA VIRUS 1 BY PCR: NOT DETECTED
PARAINFLUENZA VIRUS 2 BY PCR: NOT DETECTED
PARAINFLUENZA VIRUS 3 BY PCR: NOT DETECTED
PARAINFLUENZA VIRUS 4 BY PCR: NOT DETECTED
PDW BLD-RTO: 12.3 FL (ref 11.5–15)
PHOSPHORUS: 2.8 MG/DL (ref 2.5–4.5)
PLATELET # BLD: 309 E9/L (ref 130–450)
PMV BLD AUTO: 9.3 FL (ref 7–12)
POTASSIUM SERPL-SCNC: 4 MMOL/L (ref 3.5–5)
PROTHROMBIN TIME: 14 SEC (ref 9.3–12.4)
RBC # BLD: 4.23 E12/L (ref 3.8–5.8)
RESPIRATORY SYNCYTIAL VIRUS BY PCR: NOT DETECTED
SARS-COV-2, PCR: DETECTED
SODIUM BLD-SCNC: 136 MMOL/L (ref 132–146)
TOTAL PROTEIN: 7.1 G/DL (ref 6.4–8.3)
TRIGL SERPL-MCNC: 77 MG/DL (ref 0–149)
TSH SERPL DL<=0.05 MIU/L-ACNC: 0.21 UIU/ML (ref 0.27–4.2)
VLDLC SERPL CALC-MCNC: 15 MG/DL
WBC # BLD: 12.6 E9/L (ref 4.5–11.5)

## 2020-12-17 PROCEDURE — 85384 FIBRINOGEN ACTIVITY: CPT

## 2020-12-17 PROCEDURE — 2580000003 HC RX 258: Performed by: INTERNAL MEDICINE

## 2020-12-17 PROCEDURE — 93308 TTE F-UP OR LMTD: CPT

## 2020-12-17 PROCEDURE — 84100 ASSAY OF PHOSPHORUS: CPT

## 2020-12-17 PROCEDURE — 85025 COMPLETE CBC W/AUTO DIFF WBC: CPT

## 2020-12-17 PROCEDURE — 84443 ASSAY THYROID STIM HORMONE: CPT

## 2020-12-17 PROCEDURE — 85610 PROTHROMBIN TIME: CPT

## 2020-12-17 PROCEDURE — 83735 ASSAY OF MAGNESIUM: CPT

## 2020-12-17 PROCEDURE — 6360000002 HC RX W HCPCS: Performed by: INTERNAL MEDICINE

## 2020-12-17 PROCEDURE — 6360000002 HC RX W HCPCS: Performed by: STUDENT IN AN ORGANIZED HEALTH CARE EDUCATION/TRAINING PROGRAM

## 2020-12-17 PROCEDURE — 80053 COMPREHEN METABOLIC PANEL: CPT

## 2020-12-17 PROCEDURE — 80061 LIPID PANEL: CPT

## 2020-12-17 PROCEDURE — 1200000000 HC SEMI PRIVATE

## 2020-12-17 PROCEDURE — 85378 FIBRIN DEGRADE SEMIQUANT: CPT

## 2020-12-17 PROCEDURE — 6370000000 HC RX 637 (ALT 250 FOR IP): Performed by: INTERNAL MEDICINE

## 2020-12-17 PROCEDURE — 36415 COLL VENOUS BLD VENIPUNCTURE: CPT

## 2020-12-17 PROCEDURE — 85730 THROMBOPLASTIN TIME PARTIAL: CPT

## 2020-12-17 PROCEDURE — 83036 HEMOGLOBIN GLYCOSYLATED A1C: CPT

## 2020-12-17 RX ORDER — ALLOPURINOL 300 MG/1
300 TABLET ORAL DAILY
Status: DISCONTINUED | OUTPATIENT
Start: 2020-12-17 | End: 2020-12-18 | Stop reason: HOSPADM

## 2020-12-17 RX ORDER — VITAMIN B COMPLEX
4000 TABLET ORAL DAILY
Status: DISCONTINUED | OUTPATIENT
Start: 2020-12-18 | End: 2020-12-18 | Stop reason: HOSPADM

## 2020-12-17 RX ADMIN — AZITHROMYCIN MONOHYDRATE 500 MG: 250 TABLET ORAL at 07:41

## 2020-12-17 RX ADMIN — PYRIDOXINE HCL TAB 50 MG 50 MG: 50 TAB at 07:41

## 2020-12-17 RX ADMIN — HEPARIN SODIUM 20 UNITS/KG/HR: 10000 INJECTION, SOLUTION INTRAVENOUS at 06:43

## 2020-12-17 RX ADMIN — OXYCODONE HYDROCHLORIDE AND ACETAMINOPHEN 1000 MG: 500 TABLET ORAL at 07:40

## 2020-12-17 RX ADMIN — HEPARIN SODIUM 3970 UNITS: 1000 INJECTION INTRAVENOUS; SUBCUTANEOUS at 19:55

## 2020-12-17 RX ADMIN — Medication 100 MG: at 07:40

## 2020-12-17 RX ADMIN — SODIUM CHLORIDE, PRESERVATIVE FREE 10 ML: 5 INJECTION INTRAVENOUS at 20:44

## 2020-12-17 RX ADMIN — Medication 2000 UNITS: at 07:41

## 2020-12-17 RX ADMIN — HEPARIN SODIUM 3970 UNITS: 1000 INJECTION INTRAVENOUS; SUBCUTANEOUS at 02:55

## 2020-12-17 RX ADMIN — ZINC SULFATE 220 MG (50 MG) CAPSULE 50 MG: CAPSULE at 07:41

## 2020-12-17 RX ADMIN — ALLOPURINOL 300 MG: 300 TABLET ORAL at 07:41

## 2020-12-17 RX ADMIN — ACETAMINOPHEN 650 MG: 325 TABLET, FILM COATED ORAL at 06:44

## 2020-12-17 RX ADMIN — HEPARIN SODIUM 22 UNITS/KG/HR: 10000 INJECTION, SOLUTION INTRAVENOUS at 21:52

## 2020-12-17 RX ADMIN — WATER 1 G: 1 INJECTION INTRAMUSCULAR; INTRAVENOUS; SUBCUTANEOUS at 20:43

## 2020-12-17 RX ADMIN — SODIUM CHLORIDE, PRESERVATIVE FREE 10 ML: 5 INJECTION INTRAVENOUS at 07:40

## 2020-12-17 ASSESSMENT — PAIN DESCRIPTION - LOCATION
LOCATION: HEAD
LOCATION: HEAD

## 2020-12-17 ASSESSMENT — PAIN DESCRIPTION - DESCRIPTORS: DESCRIPTORS: ACHING

## 2020-12-17 NOTE — PROGRESS NOTES
PT refused 2045 dose of fentanyl. Waste documented via miscellaneous return per pharmacy recommendation. Mabel Godfrey RN was witness to RN's return. Pharmacy updated.  Nereida Odell

## 2020-12-17 NOTE — CARE COORDINATION
12-17-Cm note: pt is independent , he is on room air, no DME . Pt plans on returning home at dc with no needs. Per  Note pt will need Xarelto or Eliquis at dc, I can check the benefits once I know which one is ordered. Plan is home with no dc needs. Pt's ezekiel Bhatia  will provide transportation home .  Electronically signed by Liu Londono RN on 12/17/2020 at 11:38 AM

## 2020-12-17 NOTE — PROGRESS NOTES
Department of Internal Medicine  History and Physical    PCP: Dr. Nohemi Rachel    Admitting Physician: Dr. Earl Coventry  Consultants:       CHIEF COMPLAINT: Right-sided flank pain    HISTORY OF PRESENT ILLNESS:    Patient is 49-year-old male who presented to the ED due to right sided flank pain. Patient states that he had this for the last few days. Due to the pain he does have issues with his breathing. He is not able to take deep breaths without pain. Patient recently tested positive for COVID-19 on 12/8/2020. His wife had contracted COVID-19 and as a result he was tested. He denies any symptoms from COVID-19. He denies fever or chills. Denies nausea or vomiting. He denies loss of smell or taste. He denies any lower extremity or upper extremity swelling. He denies chest pain. Family history is significant for coronary artery disease. His father had CABG in his 46s. He does chew tobacco.  He does have a history of tobacco abuse. He does occasionally drink alcohol. Denies any recent travel or procedures. 12/17/2020  Patient seen and examined on telemetry floor. Patient feels better today. Patient denies any shortness of breath at rest.  He denies any chest pain or abdominal pain. There is no nausea vomiting or dizziness. BUN/creatinine is 14/1.0. Liver enzymes were normal with WBC 12.6 and hemoglobin 13.2. Vitamin D was only 22. Patient temperature 98.2 with heart rate of 74. Blood pressure currently 132/74 with patient O2 sats 95% room air at rest.  CT scan reviewed with patient. Also reviewed lab work. Pending hematology evaluation today. PAST MEDICAL Hx:  History reviewed. No pertinent past medical history. PAST SURGICAL Hx:   History reviewed. No pertinent surgical history. FAMILY Hx:  Family History   Problem Relation Age of Onset    Heart Surgery Father        HOME MEDICATIONS:  Prior to Admission medications    Medication Sig Start Date End Date Taking?  Authorizing Provider allopurinol (ZYLOPRIM) 300 MG tablet Take 300 mg by mouth daily   Yes Historical Provider, MD       ALLERGIES:  Patient has no known allergies. SOCIAL Hx:  Social History     Socioeconomic History    Marital status: Single     Spouse name: Not on file    Number of children: Not on file    Years of education: Not on file    Highest education level: Not on file   Occupational History    Not on file   Social Needs    Financial resource strain: Not on file    Food insecurity     Worry: Not on file     Inability: Not on file    Transportation needs     Medical: Not on file     Non-medical: Not on file   Tobacco Use    Smoking status: Former Smoker     Packs/day: 0.50     Quit date: 2016     Years since quittin.4    Smokeless tobacco: Current User     Types: Chew   Substance and Sexual Activity    Alcohol use: Yes     Comment: occ    Drug use: No    Sexual activity: Not on file   Lifestyle    Physical activity     Days per week: Not on file     Minutes per session: Not on file    Stress: Not on file   Relationships    Social connections     Talks on phone: Not on file     Gets together: Not on file     Attends [employer] service: Not on file     Active member of club or organization: Not on file     Attends meetings of clubs or organizations: Not on file     Relationship status: Not on file    Intimate partner violence     Fear of current or ex partner: Not on file     Emotionally abused: Not on file     Physically abused: Not on file     Forced sexual activity: Not on file   Other Topics Concern    Not on file   Social History Narrative    Not on file       ROS: Positive in bold  General:   Denies chills, fatigue, fever, malaise, night sweats or weight loss    Psychological:   Denies anxiety, disorientation or hallucinations    ENT:    Denies epistaxis, headaches, vertigo or visual changes    Cardiovascular:   Denies any chest pain, irregular heartbeats, or palpitations.  No paroxysmal nocturnal dyspnea. Respiratory:   Denies shortness of breath, coughing, sputum production, hemoptysis, or wheezing. No orthopnea. Gastrointestinal:   Denies nausea, vomiting, diarrhea, or constipation. Denies any abdominal pain. Denies change in bowel habits or stools. Genito-Urinary:    Denies any urgency, frequency, hematuria. Voiding without difficulty. Musculoskeletal:   Denies joint pain, joint stiffness, joint swelling or muscle pain    Neurology:    Denies any headache or focal neurological deficits. No weakness or paresthesia. Derm:    Denies any rashes, ulcers, or excoriations. Denies bruising. Extremities:   Denies any lower extremity swelling or edema. PHYSICAL EXAM:  VITALS:  Vitals:    12/17/20 0800   BP: 132/74   Pulse: 74   Resp: 18   Temp: 98.2 °F (36.8 °C)   SpO2: 95%         CONSTITUTIONAL:    Awake, alert, cooperative, no apparent distress, and appears stated age    EYES:    PERRL, EOMI, sclera clear, conjunctiva normal    ENT:    Normocephalic, atraumatic, sinuses nontender on palpation. External ears without lesions. Oral pharynx with moist mucus membranes. Tonsils without erythema or exudates. NECK:    Supple, symmetrical, trachea midline, no adenopathy, thyroid symmetric, not enlarged and no tenderness, skin normal, no bruits, no JVD    HEMATOLOGIC/LYMPHATICS:    No cervical lymphadenopathy and no supraclavicular lymphadenopathy    LUNGS:    Symmetric. No increased work of breathing, good air exchange, clear to auscultation bilaterally, no wheezes, rhonchi, or rales,     CARDIOVASCULAR:    Normal apical impulse, regular rate and rhythm, normal S1 and S2, no S3 or S4, and no murmur noted    ABDOMEN:    No scars, normal bowel sounds, soft, non-distended, non-tender, no masses palpated, no hepatosplenomegaly, no rebound or guarding elicited on palpation     MUSCULOSKELETAL:    There is no redness, warmth, or swelling of the joints. Full range of motion noted. Motor strength is 5 out of 5 all extremities bilaterally. Tone is normal.    NEUROLOGIC:    Awake, alert, oriented to name, place and time. Cranial nerves II-XII are grossly intact. Motor is 5 out of 5 bilaterally. SKIN:    No bruising or bleeding. No redness, warmth, or swelling    EXTREMITIES:    Peripheral pulses present. No edema, cyanosis, or swelling. OSTEOPATHIC:    Examined in seated and supine positions. Normal thoracic kyphosis and lumbar lordosis. No acute somatic dysfunction. LINES/CATHETERS     LABORATORY DATA:  CBC with Differential:    Lab Results   Component Value Date    WBC 12.6 12/17/2020    RBC 4.23 12/17/2020    HGB 13.2 12/17/2020    HCT 39.4 12/17/2020     12/17/2020    MCV 93.1 12/17/2020    MCH 31.2 12/17/2020    MCHC 33.5 12/17/2020    RDW 12.3 12/17/2020    LYMPHOPCT 18.0 12/17/2020    MONOPCT 10.2 12/17/2020    BASOPCT 0.4 12/17/2020    MONOSABS 1.28 12/17/2020    LYMPHSABS 2.27 12/17/2020    EOSABS 0.14 12/17/2020    BASOSABS 0.05 12/17/2020     CMP:    Lab Results   Component Value Date     12/17/2020    K 4.0 12/17/2020    K 4.7 12/16/2020     12/17/2020    CO2 25 12/17/2020    BUN 14 12/17/2020    CREATININE 1.0 12/17/2020    GFRAA >60 12/17/2020    LABGLOM >60 12/17/2020    GLUCOSE 103 12/17/2020    PROT 7.1 12/17/2020    LABALBU 3.7 12/17/2020    CALCIUM 8.8 12/17/2020    BILITOT 1.0 12/17/2020    ALKPHOS 57 12/17/2020    AST 16 12/17/2020    ALT 19 12/17/2020       ASSESSMENT/PLAN:  1. Bilateral PE most notable in lower lobes with concern for right heart strain. Likely provoked by COVID-19 infection. 1. Patient started on heparin drip. 2. Eliquis or Xarelto prior to discharge. 3. Echocardiogram.    4. Bilateral lower extremity ultrasound-no blockage  5. Hematology and oncology will be consulted. 2. COVID-19  1. Patient was diagnosed on 12/8/2020.    2. Overall he is asymptomatic except for pain on the right side with breathing deeply. 3. Supplements. 4. COVID-19 biomarkers. 5. On room air he is saturating in normal limits and willCheck his saturations on ambulation. 3. Possible pneumonia bacterial versus viral  1. Obtain cultures. 2. Start on antibiotics for now. 3. Procalcitonin  4. History of tobacco abuse. 5. Current use of chewing tobacco  6.  Family history of premature coronary artery disease    -Heparin drip per protocol with PTTs every 6 hours  -O2 nasal cannula.  -Activity-up ad syed. and will check heart rate and O2 saturation  -General diet      Laura Monroy D.O.  1:13 PM  12/17/2020

## 2020-12-17 NOTE — CONSULTS
[facility]      Patient Name: Jermaine Shrestha  YOB: 1980  PCP: Gasper Rojas DO   Referring Provider:      Reason for Consultation:   Chief Complaint   Patient presents with    Back Pain     Right side pain. Tested positive 12-8-2020 for covid.  Flank Pain        History of Present Illness: This pt is a 40 yo male who presented to the ER with R flank pain and PATRICK (increased pain with deep inspiration). He was covid-19+ on 12/8/20 (though asymptomatic). In the ER he underwent CTA showing BL PE (mostly in BL lower lobes) and possible area of infarction in the RLL. US BL LE was negative for VTE. He was placed on a heparin drip and has already had resolution of his symptoms. He has no prior or family history of VTE. He is a former smoker. He does not take testosterone replacement medications. He is not excessively obese    Diagnostic Data:     History reviewed. No pertinent past medical history. Patient Active Problem List    Diagnosis Date Noted    Pulmonary embolism, bilateral ([address] 12/16/2020        History reviewed. No pertinent surgical history. Family History  Family History   Problem Relation Age of Onset    Heart Surgery Father        Social History    TOBACCO:   reports that he quit smoking about 4 years ago. He smoked 0.50 packs per day. His smokeless tobacco use includes chew. ETOH:   reports current alcohol use. Home Medications  Prior to Admission medications    Medication Sig Start Date End Date Taking?  Authorizing Provider   allopurinol (ZYLOPRIM) 300 MG tablet Take 300 mg by mouth daily   Yes Historical Provider, MD       Allergies  No Known Allergies    Review of Systems:    R flank pain with deep inspiration, improved      Objective  /74   Pulse 74   Temp 98.2 °F (36.8 °C) (Oral)   Resp 18   Ht 6' 3\" (1.905 m)   Wt 219 lb (99.3 kg)   SpO2 95%   BMI 27.37 kg/m²     Physical Exam: Performance Status:  General: AAO to person, place, time, in no acute distress, pleasant   Head and neck : PERRLA, EOMI . Sclera non icteric. Oropharynx : Clear  Neck: no JVD,  no adenopathy  LYMPHATICS : No LAD  Heart: Regular rate and regular rhythm, no murmur  Lungs: Clear to auscultation   Extremities: No edema,no cyanosis, no clubbing. Abdomen: Soft, non-tender;no masses, no organomegaly  Skin:  No rash  Neurologic:Cranial nerves grossly intact. No focal motor or sensory deficits    Recent Laboratory Data-   Lab Results   Component Value Date    WBC 12.6 (H) 12/17/2020    HGB 13.2 12/17/2020    HCT 39.4 12/17/2020    MCV 93.1 12/17/2020     12/17/2020    LYMPHOPCT 18.0 (L) 12/17/2020    RBC 4.23 12/17/2020    MCH 31.2 12/17/2020    MCHC 33.5 12/17/2020    RDW 12.3 12/17/2020    NEUTOPHILPCT 69.8 12/17/2020    MONOPCT 10.2 12/17/2020    BASOPCT 0.4 12/17/2020    NEUTROABS 8.80 (H) 12/17/2020    LYMPHSABS 2.27 12/17/2020    MONOSABS 1.28 (H) 12/17/2020    EOSABS 0.14 12/17/2020    BASOSABS 0.05 12/17/2020       Lab Results   Component Value Date     12/17/2020    K 4.0 12/17/2020     12/17/2020    CO2 25 12/17/2020    BUN 14 12/17/2020    CREATININE 1.0 12/17/2020    GLUCOSE 103 (H) 12/17/2020    CALCIUM 8.8 12/17/2020    PROT 7.1 12/17/2020    LABALBU 3.7 12/17/2020    BILITOT 1.0 12/17/2020    ALKPHOS 57 12/17/2020    AST 16 12/17/2020    ALT 19 12/17/2020    LABGLOM >60 12/17/2020    GFRAA >60 12/17/2020       Lab Results   Component Value Date    FERRITIN 397 12/16/2020           Radiology-    US DUP LOWER EXTREMITIES BILATERAL VENOUS   Final Result   No evidence of DVT in either lower extremity. CTA PULMONARY W CONTRAST   Final Result   1. Bilateral pulmonary emboli, most notable in the lower lobes. 2. Mild dilation of the main pulmonary artery and flattening of the   interventricular septum raise concern for right heart strain.    3. Pulmonary opacity in the right lower lobe may represent an   infectious/inflammatory process or embolism-related infarction. The findings were submitted to the Radiology Results [address] at   13:19 on 12/16/2020  to in be communicated to a licensed caregiver. ASSESSMENT/PLAN :  42 yo male  COVID-19+  BL PE    - On heparin gtt  - Transition to Eliquis or Xarelto on DC depending on whichever is cheaper for him  - Renal function appropriate for NOAC  - Risk factors for PE include hypercoagulability due to covid infection, otherwise this seems unprovoked  - Minimum 6 months AC, likely 1 year (with last 6 months on 2.5 mg BID)  - Would not send thrombophilia panel at this time  - To follow with me on DC for [facility] [place], operated by [facility] monitoring    Thank you for this consult.  Please call with further questions or concerns      Electronically signed by Radha Claire MD on 12/17/2020 at 4:06 PM

## 2020-12-18 VITALS
SYSTOLIC BLOOD PRESSURE: 165 MMHG | HEART RATE: 76 BPM | DIASTOLIC BLOOD PRESSURE: 77 MMHG | WEIGHT: 219 LBS | RESPIRATION RATE: 18 BRPM | OXYGEN SATURATION: 97 % | BODY MASS INDEX: 27.23 KG/M2 | TEMPERATURE: 98.1 F | HEIGHT: 75 IN

## 2020-12-18 LAB
APTT: 93.7 SEC (ref 24.5–35.1)
BASOPHILS ABSOLUTE: 0.04 E9/L (ref 0–0.2)
BASOPHILS RELATIVE PERCENT: 0.3 % (ref 0–2)
D DIMER: 768 NG/ML DDU
EOSINOPHILS ABSOLUTE: 0.07 E9/L (ref 0.05–0.5)
EOSINOPHILS RELATIVE PERCENT: 0.6 % (ref 0–6)
FIBRINOGEN: >700 MG/DL (ref 225–540)
HCT VFR BLD CALC: 38.1 % (ref 37–54)
HEMOGLOBIN: 13.2 G/DL (ref 12.5–16.5)
IMMATURE GRANULOCYTES #: 0.05 E9/L
IMMATURE GRANULOCYTES %: 0.4 % (ref 0–5)
INR BLD: 1.3
L. PNEUMOPHILA SEROGP 1 UR AG: NORMAL
LYMPHOCYTES ABSOLUTE: 1.58 E9/L (ref 1.5–4)
LYMPHOCYTES RELATIVE PERCENT: 12.9 % (ref 20–42)
MCH RBC QN AUTO: 31.5 PG (ref 26–35)
MCHC RBC AUTO-ENTMCNC: 34.6 % (ref 32–34.5)
MCV RBC AUTO: 90.9 FL (ref 80–99.9)
MONOCYTES ABSOLUTE: 1.36 E9/L (ref 0.1–0.95)
MONOCYTES RELATIVE PERCENT: 11.1 % (ref 2–12)
NEUTROPHILS ABSOLUTE: 9.13 E9/L (ref 1.8–7.3)
NEUTROPHILS RELATIVE PERCENT: 74.7 % (ref 43–80)
PDW BLD-RTO: 12.2 FL (ref 11.5–15)
PLATELET # BLD: 324 E9/L (ref 130–450)
PMV BLD AUTO: 9.1 FL (ref 7–12)
PROTHROMBIN TIME: 14.2 SEC (ref 9.3–12.4)
RBC # BLD: 4.19 E12/L (ref 3.8–5.8)
STREP PNEUMONIAE ANTIGEN, URINE: NORMAL
WBC # BLD: 12.2 E9/L (ref 4.5–11.5)

## 2020-12-18 PROCEDURE — 85384 FIBRINOGEN ACTIVITY: CPT

## 2020-12-18 PROCEDURE — 6360000002 HC RX W HCPCS: Performed by: INTERNAL MEDICINE

## 2020-12-18 PROCEDURE — 2580000003 HC RX 258: Performed by: INTERNAL MEDICINE

## 2020-12-18 PROCEDURE — 85378 FIBRIN DEGRADE SEMIQUANT: CPT

## 2020-12-18 PROCEDURE — 6370000000 HC RX 637 (ALT 250 FOR IP): Performed by: INTERNAL MEDICINE

## 2020-12-18 PROCEDURE — 36415 COLL VENOUS BLD VENIPUNCTURE: CPT

## 2020-12-18 PROCEDURE — 85730 THROMBOPLASTIN TIME PARTIAL: CPT

## 2020-12-18 PROCEDURE — 85025 COMPLETE CBC W/AUTO DIFF WBC: CPT

## 2020-12-18 PROCEDURE — 85610 PROTHROMBIN TIME: CPT

## 2020-12-18 RX ORDER — GAUZE BANDAGE 2" X 2"
100 BANDAGE TOPICAL DAILY
Refills: 0 | COMMUNITY
Start: 2020-12-19

## 2020-12-18 RX ORDER — DEXAMETHASONE 6 MG/1
6 TABLET ORAL
Qty: 7 TABLET | Refills: 0 | Status: SHIPPED | OUTPATIENT
Start: 2020-12-18 | End: 2020-12-18 | Stop reason: SDUPTHER

## 2020-12-18 RX ORDER — CHOLECALCIFEROL (VITAMIN D3) 50 MCG
2000 TABLET ORAL DAILY
Qty: 60 TABLET | COMMUNITY
Start: 2020-12-19

## 2020-12-18 RX ORDER — AZITHROMYCIN 500 MG/1
500 TABLET, FILM COATED ORAL DAILY
Qty: 5 TABLET | Refills: 0 | Status: SHIPPED | OUTPATIENT
Start: 2020-12-19 | End: 2020-12-18

## 2020-12-18 RX ORDER — ZINC SULFATE 50(220)MG
50 CAPSULE ORAL DAILY
Qty: 30 CAPSULE | Refills: 3 | COMMUNITY
Start: 2020-12-19

## 2020-12-18 RX ORDER — DEXAMETHASONE 6 MG/1
6 TABLET ORAL
Qty: 7 TABLET | Refills: 0 | Status: SHIPPED | OUTPATIENT
Start: 2020-12-18 | End: 2020-12-25

## 2020-12-18 RX ORDER — AZITHROMYCIN 500 MG/1
500 TABLET, FILM COATED ORAL DAILY
Qty: 5 TABLET | Refills: 0 | Status: SHIPPED | OUTPATIENT
Start: 2020-12-19 | End: 2020-12-24

## 2020-12-18 RX ADMIN — SODIUM CHLORIDE, PRESERVATIVE FREE 10 ML: 5 INJECTION INTRAVENOUS at 08:19

## 2020-12-18 RX ADMIN — AZITHROMYCIN MONOHYDRATE 500 MG: 250 TABLET ORAL at 08:19

## 2020-12-18 RX ADMIN — ALLOPURINOL 300 MG: 300 TABLET ORAL at 08:19

## 2020-12-18 RX ADMIN — Medication 4000 UNITS: at 08:19

## 2020-12-18 RX ADMIN — PYRIDOXINE HCL TAB 50 MG 50 MG: 50 TAB at 08:19

## 2020-12-18 RX ADMIN — HEPARIN SODIUM 21.95 UNITS/KG/HR: 10000 INJECTION, SOLUTION INTRAVENOUS at 13:59

## 2020-12-18 RX ADMIN — Medication 100 MG: at 08:19

## 2020-12-18 RX ADMIN — ZINC SULFATE 220 MG (50 MG) CAPSULE 50 MG: CAPSULE at 08:19

## 2020-12-18 RX ADMIN — OXYCODONE HYDROCHLORIDE AND ACETAMINOPHEN 1000 MG: 500 TABLET ORAL at 08:19

## 2020-12-18 RX ADMIN — ACETAMINOPHEN 650 MG: 325 TABLET, FILM COATED ORAL at 08:40

## 2020-12-18 RX ADMIN — APIXABAN 10 MG: 5 TABLET, FILM COATED ORAL at 19:05

## 2020-12-18 ASSESSMENT — PAIN SCALES - GENERAL
PAINLEVEL_OUTOF10: 5
PAINLEVEL_OUTOF10: 0

## 2020-12-18 NOTE — PROGRESS NOTES
Department of Internal Medicine  History and Physical    PCP: Dr. Edgar Combs    Admitting Physician: Dr. Orlando Phan  Consultants:       CHIEF COMPLAINT: Right-sided flank pain    HISTORY OF PRESENT ILLNESS:    Patient is 49-year-old male who presented to the ED due to right sided flank pain. Patient states that he had this for the last few days. Due to the pain he does have issues with his breathing. He is not able to take deep breaths without pain. Patient recently tested positive for COVID-19 on 12/8/2020. His wife had contracted COVID-19 and as a result he was tested. He denies any symptoms from COVID-19. He denies fever or chills. Denies nausea or vomiting. He denies loss of smell or taste. He denies any lower extremity or upper extremity swelling. He denies chest pain. Family history is significant for coronary artery disease. His father had CABG in his 46s. He does chew tobacco.  He does have a history of tobacco abuse. He does occasionally drink alcohol. Denies any recent travel or procedures. 12/17/2020  Patient seen and examined on telemetry floor. Patient feels better today. Patient denies any shortness of breath at rest.  He denies any chest pain or abdominal pain. There is no nausea vomiting or dizziness. BUN/creatinine is 14/1.0. Liver enzymes were normal with WBC 12.6 and hemoglobin 13.2. Vitamin D was only 22. Patient temperature 98.2 with heart rate of 74. Blood pressure currently 132/74 with patient O2 sats 95% room air at rest.  CT scan reviewed with patient. Also reviewed lab work. Pending hematology evaluation today. 12/18/2020  Patient seen and examined on telemetry floor. Patient denies any unusual shortness of breath. Patient states he does not have any more chest pain when he takes a deep breath. Patient denies any dizziness or palpitation. Echocardiogram reviewed and showed no evidence of pulmonary hypertension. Hematology note reviewed.   Patient vital signs stable with temperature 98.8 with heart rate of 76. O2 saturation 97% at rest.  PTT results reviewed. Case reviewed with patient again including lab tests and consult review and scans. Will increase patient's activity today doing O2 sat at rest and with activity on room air and monitor heart rate to consider discharge planning today. PAST MEDICAL Hx:  History reviewed. No pertinent past medical history. PAST SURGICAL Hx:   History reviewed. No pertinent surgical history. FAMILY Hx:  Family History   Problem Relation Age of Onset    Heart Surgery Father        HOME MEDICATIONS:  Prior to Admission medications    Medication Sig Start Date End Date Taking? Authorizing Provider   allopurinol (ZYLOPRIM) 300 MG tablet Take 300 mg by mouth daily   Yes Historical Provider, MD       ALLERGIES:  Patient has no known allergies.     SOCIAL Hx:  Social History     Socioeconomic History    Marital status: Single     Spouse name: Not on file    Number of children: Not on file    Years of education: Not on file    Highest education level: Not on file   Occupational History    Not on file   Social Needs    Financial resource strain: Not on file    Food insecurity     Worry: Not on file     Inability: Not on file    Transportation needs     Medical: Not on file     Non-medical: Not on file   Tobacco Use    Smoking status: Former Smoker     Packs/day: 0.50     Quit date: 2016     Years since quittin.4    Smokeless tobacco: Current User     Types: Chew   Substance and Sexual Activity    Alcohol use: Yes     Comment: occ    Drug use: No    Sexual activity: Not on file   Lifestyle    Physical activity     Days per week: Not on file     Minutes per session: Not on file    Stress: Not on file   Relationships    Social connections     Talks on phone: Not on file     Gets together: Not on file     Attends Worship service: Not on file     Active member of club or organization: Not on file     Attends meetings of clubs or organizations: Not on file     Relationship status: Not on file    Intimate partner violence     Fear of current or ex partner: Not on file     Emotionally abused: Not on file     Physically abused: Not on file     Forced sexual activity: Not on file   Other Topics Concern    Not on file   Social History Narrative    Not on file       ROS: Positive in bold  General:   Denies chills, fatigue, fever, malaise, night sweats or weight loss    Psychological:   Denies anxiety, disorientation or hallucinations    ENT:    Denies epistaxis, headaches, vertigo or visual changes    Cardiovascular:   Denies any chest pain, irregular heartbeats, or palpitations. No paroxysmal nocturnal dyspnea. Respiratory:   Denies shortness of breath, coughing, sputum production, hemoptysis, or wheezing. No orthopnea. Gastrointestinal:   Denies nausea, vomiting, diarrhea, or constipation. Denies any abdominal pain. Denies change in bowel habits or stools. Genito-Urinary:    Denies any urgency, frequency, hematuria. Voiding without difficulty. Musculoskeletal:   Denies joint pain, joint stiffness, joint swelling or muscle pain    Neurology:    Denies any headache or focal neurological deficits. No weakness or paresthesia. Derm:    Denies any rashes, ulcers, or excoriations. Denies bruising. Extremities:   Denies any lower extremity swelling or edema. PHYSICAL EXAM:  VITALS:  Vitals:    12/17/20 1715   BP: (!) 154/83   Pulse: 96   Resp: 18   Temp: 98.6 °F (37 °C)   SpO2: 97%         CONSTITUTIONAL:    Awake, alert, cooperative, no apparent distress, and appears stated age    EYES:    PERRL, EOMI, sclera clear, conjunctiva normal    ENT:    Normocephalic, atraumatic, sinuses nontender on palpation. External ears without lesions. Oral pharynx with moist mucus membranes. Tonsils without erythema or exudates.     NECK:    Supple, symmetrical, trachea midline, no adenopathy, thyroid symmetric, not enlarged and no tenderness, skin normal, no bruits, no JVD    HEMATOLOGIC/LYMPHATICS:    No cervical lymphadenopathy and no supraclavicular lymphadenopathy    LUNGS:    Symmetric. No increased work of breathing, good air exchange, clear to auscultation bilaterally, no wheezes, rhonchi, or rales,     CARDIOVASCULAR:    Normal apical impulse, regular rate and rhythm, normal S1 and S2, no S3 or S4, and no murmur noted    ABDOMEN:    No scars, normal bowel sounds, soft, non-distended, non-tender, no masses palpated, no hepatosplenomegaly, no rebound or guarding elicited on palpation     MUSCULOSKELETAL:    There is no redness, warmth, or swelling of the joints. Full range of motion noted. Motor strength is 5 out of 5 all extremities bilaterally. Tone is normal.    NEUROLOGIC:    Awake, alert, oriented to name, place and time. Cranial nerves II-XII are grossly intact. Motor is 5 out of 5 bilaterally. SKIN:    No bruising or bleeding. No redness, warmth, or swelling    EXTREMITIES:    Peripheral pulses present. No edema, cyanosis, or swelling. OSTEOPATHIC:    Examined in seated and supine positions. Normal thoracic kyphosis and lumbar lordosis. No acute somatic dysfunction.     LINES/CATHETERS     LABORATORY DATA:  CBC with Differential:    Lab Results   Component Value Date    WBC 12.2 12/18/2020    RBC 4.19 12/18/2020    HGB 13.2 12/18/2020    HCT 38.1 12/18/2020     12/18/2020    MCV 90.9 12/18/2020    MCH 31.5 12/18/2020    MCHC 34.6 12/18/2020    RDW 12.2 12/18/2020    LYMPHOPCT 12.9 12/18/2020    MONOPCT 11.1 12/18/2020    BASOPCT 0.3 12/18/2020    MONOSABS 1.36 12/18/2020    LYMPHSABS 1.58 12/18/2020    EOSABS 0.07 12/18/2020    BASOSABS 0.04 12/18/2020     CMP:    Lab Results   Component Value Date     12/17/2020    K 4.0 12/17/2020    K 4.7 12/16/2020     12/17/2020    CO2 25 12/17/2020    BUN 14 12/17/2020    CREATININE 1.0 12/17/2020    GFRAA >60 12/17/2020    LABGLOM >60 12/17/2020    GLUCOSE 103 12/17/2020    PROT 7.1 12/17/2020    LABALBU 3.7 12/17/2020    CALCIUM 8.8 12/17/2020    BILITOT 1.0 12/17/2020    ALKPHOS 57 12/17/2020    AST 16 12/17/2020    ALT 19 12/17/2020       ASSESSMENT/PLAN:  1. Bilateral PE most notable in lower lobes with concern for right heart strain. Likely provoked by COVID-19 infection. 1. Patient started on heparin drip. 2. Eliquis or Xarelto prior to discharge. 3. Echocardiogram.    4. Bilateral lower extremity ultrasound-no blockage  5. Hematology and oncology will be consulted. 2. COVID-19  1. Patient was diagnosed on 12/8/2020.    2. Overall he is asymptomatic except for pain on the right side with breathing deeply. 3. Supplements. 4. COVID-19 biomarkers. 5. On room air he is saturating in normal limits and willCheck his saturations on ambulation. 3. Possible pneumonia bacterial versus viral  1. Obtain cultures. 2. Start on antibiotics   3. Procalcitonin-0.09  4. History of tobacco abuse. 5. Current use of chewing tobacco  6.  Family history of premature coronary artery disease    -Heparin drip per protocol with PTTs every 6 hours  -O2 nasal cannula.  -Activity-up ad syed. and will check heart rate and O2 saturation  -General diet      Es Mendes, D.O.  12:10 PM  12/18/2020

## 2020-12-18 NOTE — DISCHARGE SUMMARY
Department of Internal Medicine  Discharge summary    PCP: Dr. Girard Police    Admitting Physician: Dr. Connell Aschoff  Consultants:       CHIEF COMPLAINT: Right-sided flank pain    HISTORY OF PRESENT ILLNESS:    Patient is 43-year-old male who presented to the ED due to right sided flank pain. Patient states that he had this for the last few days. Due to the pain he does have issues with his breathing. He is not able to take deep breaths without pain. Patient recently tested positive for COVID-19 on 12/8/2020. His wife had contracted COVID-19 and as a result he was tested. He denies any symptoms from COVID-19. He denies fever or chills. Denies nausea or vomiting. He denies loss of smell or taste. He denies any lower extremity or upper extremity swelling. He denies chest pain. Family history is significant for coronary artery disease. His father had CABG in his 46s. He does chew tobacco.  He does have a history of tobacco abuse. He does occasionally drink alcohol. Denies any recent travel or procedures. 12/17/2020  Patient seen and examined on telemetry floor. Patient feels better today. Patient denies any shortness of breath at rest.  He denies any chest pain or abdominal pain. There is no nausea vomiting or dizziness. BUN/creatinine is 14/1.0. Liver enzymes were normal with WBC 12.6 and hemoglobin 13.2. Vitamin D was only 22. Patient temperature 98.2 with heart rate of 74. Blood pressure currently 132/74 with patient O2 sats 95% room air at rest.  CT scan reviewed with patient. Also reviewed lab work. Pending hematology evaluation today. 12/18/2020  Patient seen and examined on telemetry floor. Patient denies any unusual shortness of breath. Patient states he does not have any more chest pain when he takes a deep breath. Patient denies any dizziness or palpitation. Echocardiogram reviewed and showed no evidence of pulmonary hypertension. Hematology note reviewed.   Patient vital signs stable with temperature 98.8 with heart rate of 76. O2 saturation 97% at rest.  PTT results reviewed. Case reviewed with patient again including lab tests and consult review and scans. Will increase patient's activity today doing O2 sat at rest and with activity on room air and monitor heart rate to consider discharge planning today. Pt is stable for discharge    PAST MEDICAL Hx:  History reviewed. No pertinent past medical history. PAST SURGICAL Hx:   History reviewed. No pertinent surgical history. FAMILY Hx:  Family History   Problem Relation Age of Onset    Heart Surgery Father        HOME MEDICATIONS:  Prior to Admission medications    Medication Sig Start Date End Date Taking? Authorizing Provider   apixaban (ELIQUIS) 5 MG TABS tablet Take 1 tablet by mouth 2 times daily This starts after the 10mg twice a day for 7 Days on 1/25 12/25/20 1/24/21 Yes Genia Frees, DO   apixaban (ELIQUIS) 5 MG TABS tablet Take 2 tablets by mouth 2 times daily for 7 days 12/18/20 12/25/20 Yes Genia Frees, DO   azithromycin (ZITHROMAX) 500 MG tablet Take 1 tablet by mouth daily for 5 days 12/19/20 12/24/20 Yes Genia Frees, DO   zinc sulfate (ZINCATE) 220 (50 Zn) MG capsule Take 1 capsule by mouth daily 12/19/20  Yes Genia Frees, DO   ascorbic acid (VITAMIN C) 1000 MG tablet Take 1 tablet by mouth daily 12/19/20  Yes Genia Frees, DO   thiamine mononitrate 100 MG tablet Take 1 tablet by mouth daily 12/19/20  Yes Genia Frees, DO   Vitamin D (CHOLECALCIFEROL) 50 MCG (2000 UT) TABS tablet Take 1 tablet by mouth daily 12/19/20  Yes Genia Frees, DO   dexamethasone (DECADRON) 6 MG tablet Take 1 tablet by mouth daily (with breakfast) for 7 days 12/18/20 12/25/20 Yes Genia Frees, DO   allopurinol (ZYLOPRIM) 300 MG tablet Take 300 mg by mouth daily   Yes Historical Provider, MD       ALLERGIES:  Patient has no known allergies.     SOCIAL Hx:  Social History     Socioeconomic History    Marital status: Single     Spouse name: Not on file    Number of children: Not on file    Years of education: Not on file    Highest education level: Not on file   Occupational History    Not on file   Social Needs    Financial resource strain: Not on file    Food insecurity     Worry: Not on file     Inability: Not on file    Transportation needs     Medical: Not on file     Non-medical: Not on file   Tobacco Use    Smoking status: Former Smoker     Packs/day: 0.50     Quit date: 2016     Years since quittin.4    Smokeless tobacco: Current User     Types: Chew   Substance and Sexual Activity    Alcohol use: Yes     Comment: occ    Drug use: No    Sexual activity: Not on file   Lifestyle    Physical activity     Days per week: Not on file     Minutes per session: Not on file    Stress: Not on file   Relationships    Social connections     Talks on phone: Not on file     Gets together: Not on file     Attends Quaker service: Not on file     Active member of club or organization: Not on file     Attends meetings of clubs or organizations: Not on file     Relationship status: Not on file    Intimate partner violence     Fear of current or ex partner: Not on file     Emotionally abused: Not on file     Physically abused: Not on file     Forced sexual activity: Not on file   Other Topics Concern    Not on file   Social History Narrative    Not on file       ROS: Positive in bold  General:   Denies chills, fatigue, fever, malaise, night sweats or weight loss    Psychological:   Denies anxiety, disorientation or hallucinations    ENT:    Denies epistaxis, headaches, vertigo or visual changes    Cardiovascular:   Denies any chest pain, irregular heartbeats, or palpitations. No paroxysmal nocturnal dyspnea. Respiratory:   Denies shortness of breath, coughing, sputum production, hemoptysis, or wheezing. No orthopnea. Gastrointestinal:   Denies nausea, vomiting, diarrhea, or constipation.   Denies any abdominal pain. Denies change in bowel habits or stools. Genito-Urinary:    Denies any urgency, frequency, hematuria. Voiding without difficulty. Musculoskeletal:   Denies joint pain, joint stiffness, joint swelling or muscle pain    Neurology:    Denies any headache or focal neurological deficits. No weakness or paresthesia. Derm:    Denies any rashes, ulcers, or excoriations. Denies bruising. Extremities:   Denies any lower extremity swelling or edema. PHYSICAL EXAM:  VITALS:  Vitals:    12/18/20 0735   BP: (!) 165/77   Pulse: 76   Resp: 18   Temp: 98.1 °F (36.7 °C)   SpO2: 97%         CONSTITUTIONAL:    Awake, alert, cooperative, no apparent distress, and appears stated age    EYES:    PERRL, EOMI, sclera clear, conjunctiva normal    ENT:    Normocephalic, atraumatic, sinuses nontender on palpation. External ears without lesions. Oral pharynx with moist mucus membranes. Tonsils without erythema or exudates. NECK:    Supple, symmetrical, trachea midline, no adenopathy, thyroid symmetric, not enlarged and no tenderness, skin normal, no bruits, no JVD    HEMATOLOGIC/LYMPHATICS:    No cervical lymphadenopathy and no supraclavicular lymphadenopathy    LUNGS:    Symmetric. No increased work of breathing, good air exchange, clear to auscultation bilaterally, no wheezes, rhonchi, or rales,     CARDIOVASCULAR:    Normal apical impulse, regular rate and rhythm, normal S1 and S2, no S3 or S4, and no murmur noted    ABDOMEN:    No scars, normal bowel sounds, soft, non-distended, non-tender, no masses palpated, no hepatosplenomegaly, no rebound or guarding elicited on palpation     MUSCULOSKELETAL:    There is no redness, warmth, or swelling of the joints. Full range of motion noted. Motor strength is 5 out of 5 all extremities bilaterally. Tone is normal.    NEUROLOGIC:    Awake, alert, oriented to name, place and time. Cranial nerves II-XII are grossly intact.   Motor is 5 out of 5 bilaterally. SKIN:    No bruising or bleeding. No redness, warmth, or swelling    EXTREMITIES:    Peripheral pulses present. No edema, cyanosis, or swelling. LINES/CATHETERS     LABORATORY DATA:  CBC with Differential:    Lab Results   Component Value Date    WBC 12.2 12/18/2020    RBC 4.19 12/18/2020    HGB 13.2 12/18/2020    HCT 38.1 12/18/2020     12/18/2020    MCV 90.9 12/18/2020    MCH 31.5 12/18/2020    MCHC 34.6 12/18/2020    RDW 12.2 12/18/2020    LYMPHOPCT 12.9 12/18/2020    MONOPCT 11.1 12/18/2020    BASOPCT 0.3 12/18/2020    MONOSABS 1.36 12/18/2020    LYMPHSABS 1.58 12/18/2020    EOSABS 0.07 12/18/2020    BASOSABS 0.04 12/18/2020     CMP:    Lab Results   Component Value Date     12/17/2020    K 4.0 12/17/2020    K 4.7 12/16/2020     12/17/2020    CO2 25 12/17/2020    BUN 14 12/17/2020    CREATININE 1.0 12/17/2020    GFRAA >60 12/17/2020    LABGLOM >60 12/17/2020    GLUCOSE 103 12/17/2020    PROT 7.1 12/17/2020    LABALBU 3.7 12/17/2020    CALCIUM 8.8 12/17/2020    BILITOT 1.0 12/17/2020    ALKPHOS 57 12/17/2020    AST 16 12/17/2020    ALT 19 12/17/2020       ASSESSMENT/PLAN:  1. Bilateral PE most notable in lower lobes with concern for right heart strain. Likely provoked by COVID-19 infection. 2. COVID-19  3. Possible pneumonia bacterial versus viral  4. History of tobacco abuse. 5. Current use of chewing tobacco  6.  Family history of premature coronary artery disease    Plan:  Discharge home today  Rx Eliquis started pack  Rx Eliquis 5 mg bid-start 1 month  Rx Decadron 6 ng qd x 7 wanda  Rx Zithromax 500 mg qd x 5 days    Zinc 50 mg qd-otc  Vit C 1000mg qd -otc  Vit D 2000 units qd- otc  Vit B 1 100 mg qd    F/U with Hematology as directed  F/U with PCP 1 week or as directed      Thompson Stevens D.O.  6:36 PM  12/18/2020

## 2020-12-18 NOTE — PROGRESS NOTES
CLINICAL PHARMACY NOTE: MEDS TO [address] Select Patient?: No  Total # of Prescriptions Filled: 1   The following medications were delivered to the patient:  · ELIQUIS 5 MG   Total # of Interventions Completed: 3  Time Spent (min): 15    Additional Documentation:

## 2020-12-18 NOTE — PROGRESS NOTES
Pulse ox was 95% on room air at rest.  Ambulated patient on room air. Oxygen saturation was 96% on room air while ambulating. Pulse  while resting and ambulating.

## 2020-12-18 NOTE — PROCEDURES
[address]                                 ECHOCARDIOGRAM    PATIENT NAME: Jessi Camargo                  :        1980  MED REC NO:   [id number]                            ROOM:       7158  ACCOUNT NO:   [de-identified]                           ADMIT DATE: 2020  PROVIDER:     Joe Sanchez MD    ECHOCARDIOGRAPHER:  Malcolm Kent. INDICATIONS:  Acute pulmonary embolus, tachycardia, evaluate for right  heart strain, pulmonary hypertension. 2-D MEASUREMENTS:  Left ventricular outflow tract 2.0 cm. Right  ventricle diastole 2.6 cm, left ventricle diastole 4.6, systole 3.4. Septal and posterior wall thickness of the left ventricle 1.2 cm, left  atrial dimension 3.2. Left atrial area 15.5 cm2. IMPRESSION:  1. All cardiac chamber sizes including aortic root size appear to be  within normal limits. 2.  The left ventricle shows mild concentric left ventricular  hypertrophy at 1.2 cm. Systolic function shows ejection fraction 55%. There is no definite focal wall motion abnormality seen. There is  diastolic filling dysfunction stage 1.  3.  The mitral valve appears structurally normal, shows a maximal  gradient of 3 mmHg. Mitral valve area 4.4 cm2 by pressure half time. There is no significant mitral stenosis nor regurgitation. 4.  The aortic valve appears structurally normal, shows a maximal  gradient of 6.5 mmHg. Aortic valve area 2.5 cm2. There is no  significant aortic stenosis nor insufficiency seen. 5.  The pulmonic valve is poorly visualized; however, Doppler flow  characteristics show normal flow across the pulmonic valve. There is no  evidence of pulmonic stenosis nor insufficiency. The tricuspid valve  appears structurally normal, shows only trace to 1+ tricuspid  regurgitation.   Pulmonary pressures estimated at 28 mmHg, top normal. This could be an underestimate given the poor quality jet but there is  no convincing evidence of significant pulmonary hypertension on this  study. The right ventricular chamber size is normal.  The wall  thicknesses of the right ventricle are still within normal limits. Contractility of the right ventricle appears normal.  6.  There is no significant pericardial effusion nor any definite  intracavitary mass or thrombus or shunt identified on this study.         Lanier Cushing, MD    D: 12/17/2020 21:44:10       T: 12/17/2020 21:52:06     /[email or web address]  Job#: [id number]     Doc#: [id number]    CC:  DO Rossy Jean, MD Anneliese Miller DO

## 2020-12-19 ENCOUNTER — CARE COORDINATION (OUTPATIENT)
Dept: CASE MANAGEMENT | Age: 40
End: 2020-12-19

## 2020-12-19 NOTE — CARE COORDINATION
Patient contacted regarding DTNLW-08 diagnosis\". Discussed COVID-19 related testing which was available at this time. Test results were positive. Patient informed of results, if available? aware    Care Transition Nurse/ Ambulatory Care Manager contacted the patient by telephone to perform post discharge assessment. Call within 2 business days of discharge: Yes. Verified name and  with patient as identifiers. Provided introduction to self, and explanation of the CTN/ACM role, and reason for call due to risk factors for infection and/or exposure to COVID-19. Symptoms reviewed with patient who verbalized the following symptoms: less urine output. Due to no new or worsening symptoms encounter was not routed to provider for escalation. Discussed follow-up appointments. If no appointment was previously scheduled, appointment scheduling offered: Yes  [facility] follow up appointment(s): No future appointments. Non-face-to-face services provided:  Obtained and reviewed discharge summary and/or continuity of care documents  Education of patient/family/caregiver/guardian to support self-management- \    Patient has following risk factors of: no known risk factors. CTN/ACM reviewed discharge instructions, medical action plan and red flags such as increased shortness of breath, increasing fever and signs of decompensation with patient who verbalized understanding. Discussed exposure protocols and quarantine with CDC Guidelines What to do if you are sick with coronavirus disease .  Patient was given an opportunity for questions and concerns. The patient agrees to contact the [facility] exposure line [phone], [facility] department [place] Department of Health: ([phone]) and PCP office for questions related to their healthcare. CTN/ACM provided contact information for future needs.     Reviewed and educated patient on any new and changed medications related to discharge diagnosis Patient/family/caregiver given information for [facility] [place] and agrees to enroll no    Plan for follow-up call in 3-5 days based on severity of symptoms and risk factors. Pt states he's doing fine. Denies ongoing SOB, CP, fever or flu-like symptoms. States he is still coughing. Reports taking eliquis and other medications as prescribed. Agrees to schedule follow up this week with PCP. Denies questions or concerns at this time.

## 2020-12-21 LAB
Lab: NORMAL
REPORT: NORMAL
THIS TEST SENT TO: NORMAL

## 2020-12-23 ENCOUNTER — CARE COORDINATION (OUTPATIENT)
Dept: CASE MANAGEMENT | Age: 40
End: 2020-12-23

## 2020-12-23 NOTE — CARE COORDINATION
CTN final outreach call for [address] Transitions episode on 12/23/2020  Marquis Boykin has been provided the following resources and education related to COVID-19:                         Signs, symptoms and red flags related to COVID-19            CDC exposure and quarantine guidelines            [facility] exposure contact - [phone]            Contact for their local Department of Health     Spoke with Aquiles for final outreach call for [facility], [address], he reports feeling \"better\". He denies SOB and cough. He will call his PCP on Monday, 12/28/2020, to inquire about follow up. He denies any needs, questions, or concerns at this time. No further outreach scheduled with this CTN. Patient has this CTN contact information if future needs arise.

## 2021-01-05 DIAGNOSIS — I26.99 PULMONARY EMBOLISM, BILATERAL (HCC): Primary | ICD-10-CM

## 2021-08-07 ENCOUNTER — HOSPITAL ENCOUNTER (EMERGENCY)
Age: 41
Discharge: HOME | End: 2021-08-07
Payer: COMMERCIAL

## 2021-08-07 ENCOUNTER — APPOINTMENT (OUTPATIENT)
Dept: GENERAL RADIOLOGY | Age: 41
End: 2021-08-07
Payer: COMMERCIAL

## 2021-08-07 VITALS
RESPIRATION RATE: 16 BRPM | OXYGEN SATURATION: 99 % | HEART RATE: 106 BPM | TEMPERATURE: 98.4 F | SYSTOLIC BLOOD PRESSURE: 128 MMHG | DIASTOLIC BLOOD PRESSURE: 85 MMHG

## 2021-08-07 DIAGNOSIS — S46.911A ELBOW STRAIN, RIGHT, INITIAL ENCOUNTER: Primary | ICD-10-CM

## 2021-08-07 PROCEDURE — 99211 OFF/OP EST MAY X REQ PHY/QHP: CPT

## 2021-08-07 PROCEDURE — 73080 X-RAY EXAM OF ELBOW: CPT

## 2021-08-07 NOTE — ED PROVIDER NOTES
[facility]  Department of Emergency Medicine  UC Encounter Note  21   10:04 AM EDT      NAME: Bhavna Mcginnis  :  1980  MRN:  [id number]    Chief Complaint: Joint Swelling (right elbow, no injury, just hurts. started off and on about a year ago, wants an xray)      This is a 43-year-old male that presents to urgent care complaining of some right elbow pain. He denies any redness or swelling to the area although his wife thinks there is some swelling there. He does state a history of gout but he denies any gout symptoms in that area at this time. He states about a year ago he had some problems with right elbow pain after some physical activity. He states that elbow discomfort went away but recently he had been doing some sports with his child and felt some pain in the right elbow. He denies any wrist or shoulder pain. He denies any chest pain or shortness of breath. He states he is currently on Eliquis for Covid illness this past year. On first contact patient he appears to be in no acute distress. Review of Systems  Pertinent positives and negatives are stated within HPI, all other systems reviewed and are negative. Physical Exam  Vitals and nursing note reviewed. Constitutional:       Appearance: He is well-developed. HENT:      Head: Normocephalic and atraumatic. Jaw: No trismus. Right Ear: Hearing, tympanic membrane, ear canal and external ear normal.      Left Ear: Hearing, tympanic membrane, ear canal and external ear normal.      Nose: Nose normal.      Right Sinus: No maxillary sinus tenderness or frontal sinus tenderness. Left Sinus: No maxillary sinus tenderness or frontal sinus tenderness. Mouth/Throat:      Pharynx: Uvula midline. No uvula swelling. Eyes:      General: Lids are normal.      Conjunctiva/sclera: Conjunctivae normal.      Pupils: Pupils are equal, round, and reactive to light.    Cardiovascular: Rate and Rhythm: Normal rate and regular rhythm. Heart sounds: Normal heart sounds. No murmur heard. Pulmonary:      Effort: Pulmonary effort is normal.      Breath sounds: Normal breath sounds. Abdominal:      General: Bowel sounds are normal.      Palpations: Abdomen is soft. Abdomen is not rigid. Tenderness: There is no abdominal tenderness. There is no guarding or rebound. Musculoskeletal:      Right shoulder: Normal.      Right wrist: Normal.      Cervical back: Normal range of motion and neck supple. Comments: There is some right lateral elbow proximal forearm soft tissue tenderness. Range of motion is full but mildly painful. No redness or swelling to the elbow. No open area no redness no cyanosis. Has palpable radial pulse. Patient does state pain with range of motion of his forearm and hand in the right elbow area. Skin:     General: Skin is warm and dry. Findings: No abrasion or rash. Neurological:      Mental Status: He is alert and oriented to person, place, and time. GCS: GCS eye subscore is 4. GCS verbal subscore is 5. GCS motor subscore is 6. Cranial Nerves: Cranial nerves are intact. No cranial nerve deficit. Sensory: Sensation is intact. No sensory deficit. Motor: Motor function is intact. Coordination: Coordination is intact. Coordination normal.      Gait: Gait is intact. Gait normal.         Procedures    MDM  Number of Diagnoses or Management Options  Elbow strain, right, initial encounter  Diagnosis management comments: X-ray was negative. Suggest Ace wrap or tennis elbow strap. Use Tylenol for pain. Use topical anti-inflammatory medication. If not better he may need to see an orthopedic specialist.           --------------------------------------------- PAST HISTORY ---------------------------------------------  Past Medical History:  has a past medical history of COVID-19 and Hx pulmonary embolism.     Past Surgical History: has no past surgical history on file. Social History:  reports that he quit smoking about 5 years ago. He smoked 0.50 packs per day. His smokeless tobacco use includes chew. He reports current alcohol use. He reports that he does not use drugs. Family History: family history includes Heart Surgery in his father. The patients home medications have been reviewed. Allergies: Patient has no known allergies. -------------------------------------------------- RESULTS -------------------------------------------------  No results found for this visit on 08/07/21. XR ELBOW RIGHT (MIN 3 VIEWS)   Final Result   No acute abnormality.             ------------------------- NURSING NOTES AND VITALS REVIEWED ---------------------------   The nursing notes within the ED encounter and vital signs as below have been reviewed. /85   Pulse 106   Temp 98.4 °F (36.9 °C)   Resp 16   SpO2 99%   Oxygen Saturation Interpretation: Normal      ------------------------------------------ PROGRESS NOTES ------------------------------------------   I have spoken with the patient and discussed todays results, in addition to providing specific details for the plan of care and counseling regarding the diagnosis and prognosis. Their questions are answered at this time and they are agreeable with the plan.      --------------------------------- ADDITIONAL PROVIDER NOTES ---------------------------------     This patient is stable for discharge. I have shared the specific conditions for return, as well as the importance of follow-up. * NOTE: This report was transcribed using voice recognition software.  Every effort was made to ensure accuracy; however, inadvertent computerized transcription errors may be present.    --------------------------------- IMPRESSION AND DISPOSITION ---------------------------------    IMPRESSION  1. Elbow strain, right, initial encounter        DISPOSITION  Disposition: Discharge to home  Patient condition is good       Ernesto Bui PA-C  08/07/21 4564

## 2023-07-11 VITALS
DIASTOLIC BLOOD PRESSURE: 94 MMHG | RESPIRATION RATE: 16 BRPM | BODY MASS INDEX: 26.73 KG/M2 | WEIGHT: 215 LBS | TEMPERATURE: 98.1 F | OXYGEN SATURATION: 100 % | HEIGHT: 75 IN | SYSTOLIC BLOOD PRESSURE: 140 MMHG | HEART RATE: 76 BPM

## 2023-07-11 PROCEDURE — 96372 THER/PROPH/DIAG INJ SC/IM: CPT

## 2023-07-11 PROCEDURE — 99284 EMERGENCY DEPT VISIT MOD MDM: CPT

## 2023-07-11 RX ORDER — KETOROLAC TROMETHAMINE 30 MG/ML
30 INJECTION, SOLUTION INTRAMUSCULAR; INTRAVENOUS ONCE
Status: COMPLETED | OUTPATIENT
Start: 2023-07-11 | End: 2023-07-12

## 2023-07-11 RX ORDER — PREDNISONE 20 MG/1
60 TABLET ORAL ONCE
Status: COMPLETED | OUTPATIENT
Start: 2023-07-11 | End: 2023-07-12

## 2023-07-11 RX ORDER — CYCLOBENZAPRINE HCL 5 MG
10 TABLET ORAL ONCE
Status: COMPLETED | OUTPATIENT
Start: 2023-07-11 | End: 2023-07-12

## 2023-07-11 ASSESSMENT — PAIN - FUNCTIONAL ASSESSMENT: PAIN_FUNCTIONAL_ASSESSMENT: 0-10

## 2023-07-12 ENCOUNTER — HOSPITAL ENCOUNTER (EMERGENCY)
Age: 43
Discharge: HOME | End: 2023-07-12
Payer: COMMERCIAL

## 2023-07-12 ENCOUNTER — APPOINTMENT (OUTPATIENT)
Dept: GENERAL RADIOLOGY | Age: 43
End: 2023-07-12
Payer: COMMERCIAL

## 2023-07-12 DIAGNOSIS — Z53.29 LEFT AGAINST MEDICAL ADVICE: Primary | ICD-10-CM

## 2023-07-12 DIAGNOSIS — M54.16 LUMBAR RADICULOPATHY: ICD-10-CM

## 2023-07-12 DIAGNOSIS — S39.012A STRAIN OF LUMBAR REGION, INITIAL ENCOUNTER: ICD-10-CM

## 2023-07-12 DIAGNOSIS — M62.830 LUMBAR PARASPINAL MUSCLE SPASM: ICD-10-CM

## 2023-07-12 PROCEDURE — 6360000002 HC RX W HCPCS

## 2023-07-12 PROCEDURE — 72100 X-RAY EXAM L-S SPINE 2/3 VWS: CPT

## 2023-07-12 PROCEDURE — 96372 THER/PROPH/DIAG INJ SC/IM: CPT

## 2023-07-12 PROCEDURE — 6370000000 HC RX 637 (ALT 250 FOR IP)

## 2023-07-12 RX ORDER — METHYLPREDNISOLONE 4 MG/1
TABLET ORAL
Qty: 21 TABLET | Refills: 0 | Status: SHIPPED | OUTPATIENT
Start: 2023-07-12 | End: 2023-07-18

## 2023-07-12 RX ORDER — CYCLOBENZAPRINE HCL 10 MG
10 TABLET ORAL 3 TIMES DAILY PRN
Qty: 21 TABLET | Refills: 0 | Status: SHIPPED | OUTPATIENT
Start: 2023-07-12 | End: 2023-07-22

## 2023-07-12 RX ORDER — LIDOCAINE 50 MG/G
1 PATCH TOPICAL DAILY
Qty: 10 PATCH | Refills: 0 | Status: SHIPPED | OUTPATIENT
Start: 2023-07-12 | End: 2023-07-22

## 2023-07-12 RX ADMIN — KETOROLAC TROMETHAMINE 30 MG: 30 INJECTION, SOLUTION INTRAMUSCULAR; INTRAVENOUS at 01:49

## 2023-07-12 RX ADMIN — PREDNISONE 60 MG: 20 TABLET ORAL at 01:48

## 2023-07-12 RX ADMIN — CYCLOBENZAPRINE HYDROCHLORIDE 10 MG: 5 TABLET, FILM COATED ORAL at 01:48

## 2023-07-12 ASSESSMENT — PAIN SCALES - GENERAL
PAINLEVEL_OUTOF10: 10
PAINLEVEL_OUTOF10: 10

## 2023-07-12 ASSESSMENT — PAIN DESCRIPTION - ORIENTATION: ORIENTATION: LOWER

## 2023-07-12 ASSESSMENT — PAIN DESCRIPTION - LOCATION: LOCATION: BACK

## 2023-07-12 NOTE — ED PROVIDER NOTES
consistent with lumbar radiculopathy  and this is consistent with my exam findings. Patient has no red flag symptoms, no recent spinal injections to raise concern for epidural abscess/hematoma, no injury or trauma to raise concern for fracture/bony abnormality or spinal hematoma, no myelopathy or sensory deficits to suggest cord compression requiring emergent intervention. X-ray was obtained to rule out bony abnormality, disc space narrowing, and fracture. Unfortunately, their was an extended time before before results were available and the patient needed to leave for work and opted to sign out before is x-ray was resulted. He was given IM Toradol, PO Flexeril and PO loading dose of prednisone with good analgesia. X-ray was reviewed after he left AMA and no acute bony / disc space abnormalities were noted. Multilevel degenerative disc disease was noted. I utilized an evidence-based risk rating tool (CMT) along with my training and experience to weigh the risk of discharge against the risks of further testing, imaging, or hospitalization. At this time I estimate the risks of additional testing, imaging, or hospitalization to be equal to or greater than the risk of discharge. I discussed my risk assessment with the patient and the patient consents to the risk of discharge as well as the risk of uncertainty in estimating outcomes. Given the symptoms and findings present at this time, the chance of SEA or SCC is so remote that additional testing or imaging is more likely to harm the patient than diagnose SEA. On reevaluation, patient is well-appearing, nontoxic, and not in distress. This patient's ED course included: a personal history and physicial examination and re-evaluation prior to disposition. This patient has remained hemodynamically stable and improved during their ED course and at this time has opted to sign out AMA.  He will provided with prescriptions for a Medrol dose pack for inflammation and

## 2023-07-12 NOTE — ED NOTES
Final result of spine x-ray not posted. X-ray done 0206. [facility] radiology notified confirmed that they were very behind.      Lex Cordero RN  07/12/23 0194

## 2023-07-12 NOTE — DISCHARGE INSTRUCTIONS
Take medications as prescribed. Do not apply heating pad over lidocaine patch. This may cause life threatening side effects. You may use ice or heat once the patch has been removed and your have washed the skin.

## 2024-01-06 ENCOUNTER — HOSPITAL ENCOUNTER (EMERGENCY)
Age: 44
Discharge: HOME | End: 2024-01-06
Payer: COMMERCIAL

## 2024-01-06 VITALS
WEIGHT: 210 LBS | DIASTOLIC BLOOD PRESSURE: 84 MMHG | HEART RATE: 86 BPM | TEMPERATURE: 98.1 F | OXYGEN SATURATION: 99 % | BODY MASS INDEX: 26.25 KG/M2 | RESPIRATION RATE: 18 BRPM | SYSTOLIC BLOOD PRESSURE: 129 MMHG

## 2024-01-06 DIAGNOSIS — H10.9 CONJUNCTIVITIS OF LEFT EYE, UNSPECIFIED CONJUNCTIVITIS TYPE: Primary | ICD-10-CM

## 2024-01-06 PROCEDURE — 99211 OFF/OP EST MAY X REQ PHY/QHP: CPT

## 2024-01-06 RX ORDER — POLYMYXIN B SULFATE AND TRIMETHOPRIM 1; 10000 MG/ML; [USP'U]/ML
1 SOLUTION OPHTHALMIC EVERY 4 HOURS
Qty: 10 ML | Refills: 0 | Status: SHIPPED | OUTPATIENT
Start: 2024-01-06 | End: 2024-01-13

## 2024-01-06 ASSESSMENT — PAIN - FUNCTIONAL ASSESSMENT: PAIN_FUNCTIONAL_ASSESSMENT: NONE - DENIES PAIN

## 2024-01-06 NOTE — ED PROVIDER NOTES
Department of Emergency Medicine  ED Provider Note  Admit Date: 1/6/2024  Room: 02/02            HPI:  1/6/24, Time: 9:19 AM JOHANA Suh is a 43 y.o. old male presenting to the emergency department with redness irritation and drainage from the left eye it started last night.  He does not have any eye pain does not have any problems with the other eye not complaining of any sore throat or nasal congestion.    Review of Systems:   Pertinent positives and negatives are stated within HPI, all other systems reviewed and are negative.          --------------------------------------------- PAST HISTORY ---------------------------------------------  Past Medical History:  has a past medical history of COVID-19 and Hx pulmonary embolism.    Past Surgical History:  has a past surgical history that includes Vasectomy.    Social History:  reports that he quit smoking about 7 years ago. His smoking use included cigarettes. His smokeless tobacco use includes chew. He reports that he does not currently use alcohol. He reports that he does not use drugs.    Family History: family history includes Heart Surgery in his father.     The patient’s home medications have been reviewed.    Allergies: Patient has no known allergies.        ---------------------------------------------------PHYSICAL EXAM--------------------------------------    Constitutional/General: Alert and oriented x3, well appearing, non toxic in NAD  Head: Normocephalic and atraumatic. No periorbital erythema or warmth or swelling  Eyes: PERRL, 3 mm, conjunctiva left is erythematous, small amount yellow drainage  no evidence of hyphema, no evidence of entrapment. . No pain with EOM, and EOMI. Direct and consensual light reflex normal.    Mouth: Oropharynx clear, handling secretions, no trismus  Neck: Supple, full ROM  Pulmonary: Lungs clear to auscultation bilaterally. Not in respiratory distress  Cardiovascular:  Regular rate. Regular  rhythm  Abdomen: Soft.  Non tender. Non distended.  Musculoskeletal: Moves all extremities x 4. Warm and well perfused  Skin: warm and dry. No rashes.   Neurologic: GCS 15  Psych: Normal Affect    -------------------------------------------------- RESULTS -------------------------------------------------  I have personally reviewed all laboratory and imaging results for this patient. Results are listed below.     LABS:  No results found for this visit on 01/06/24.    RADIOLOGY:  Interpreted by Radiologist.  No orders to display           ------------------------- NURSING NOTES AND VITALS REVIEWED ---------------------------   The nursing notes within the ED encounter and vital signs as below have been reviewed by myself.  /84   Pulse 86   Temp 98.1 °F (36.7 °C)   Resp 18   Wt 95.3 kg (210 lb)   SpO2 99%   BMI 26.25 kg/m²   Oxygen Saturation Interpretation: Normal    The patient’s available past medical records and past encounters were reviewed.        ------------------------------ ED COURSE/MEDICAL DECISION MAKING----------------------  Medications - No data to display             Medical Decision Making:     Patient has conjunctivitis.  I did start her start him on Polytrim drops.  He says he has contacts but he has not where warned him in a long time he is not wearing them currently.      --------------------------------- IMPRESSION AND DISPOSITION ---------------------------------    IMPRESSION  1. Conjunctivitis of left eye, unspecified conjunctivitis type        DISPOSITION  Disposition: Discharge to home  Patient condition is good            Kelsie Antunez, FLORINA - CNP  01/06/24 0922

## 2024-02-15 ENCOUNTER — APPOINTMENT (OUTPATIENT)
Dept: CT IMAGING | Age: 44
End: 2024-02-15
Payer: COMMERCIAL

## 2024-02-15 ENCOUNTER — HOSPITAL ENCOUNTER (EMERGENCY)
Age: 44
Discharge: HOME | End: 2024-02-15
Attending: EMERGENCY MEDICINE
Payer: COMMERCIAL

## 2024-02-15 VITALS
SYSTOLIC BLOOD PRESSURE: 127 MMHG | OXYGEN SATURATION: 99 % | RESPIRATION RATE: 16 BRPM | TEMPERATURE: 97.8 F | HEART RATE: 95 BPM | DIASTOLIC BLOOD PRESSURE: 72 MMHG

## 2024-02-15 DIAGNOSIS — R07.9 CHEST PAIN, UNSPECIFIED TYPE: Primary | ICD-10-CM

## 2024-02-15 LAB
ALBUMIN SERPL-MCNC: 4.8 G/DL (ref 3.5–5.2)
ALP SERPL-CCNC: 68 U/L (ref 40–129)
ALT SERPL-CCNC: 29 U/L (ref 0–40)
ANION GAP SERPL CALCULATED.3IONS-SCNC: 11 MMOL/L (ref 7–16)
AST SERPL-CCNC: 19 U/L (ref 0–39)
BASOPHILS # BLD: 0.03 K/UL (ref 0–0.2)
BASOPHILS NFR BLD: 0 % (ref 0–2)
BILIRUB SERPL-MCNC: 1 MG/DL (ref 0–1.2)
BUN SERPL-MCNC: 21 MG/DL (ref 6–20)
CALCIUM SERPL-MCNC: 9.1 MG/DL (ref 8.6–10.2)
CHLORIDE SERPL-SCNC: 102 MMOL/L (ref 98–107)
CO2 SERPL-SCNC: 27 MMOL/L (ref 22–29)
CREAT SERPL-MCNC: 1.2 MG/DL (ref 0.7–1.2)
EKG ATRIAL RATE: 95 BPM
EKG P AXIS: 50 DEGREES
EKG P-R INTERVAL: 136 MS
EKG Q-T INTERVAL: 338 MS
EKG QRS DURATION: 84 MS
EKG QTC CALCULATION (BAZETT): 424 MS
EKG R AXIS: 24 DEGREES
EKG T AXIS: 61 DEGREES
EKG VENTRICULAR RATE: 95 BPM
EOSINOPHIL # BLD: 0.13 K/UL (ref 0.05–0.5)
EOSINOPHILS RELATIVE PERCENT: 2 % (ref 0–6)
ERYTHROCYTE [DISTWIDTH] IN BLOOD BY AUTOMATED COUNT: 12.9 % (ref 11.5–15)
GFR SERPL CREATININE-BSD FRML MDRD: >60 ML/MIN/1.73M2
GLUCOSE SERPL-MCNC: 99 MG/DL (ref 74–99)
HCT VFR BLD AUTO: 47.2 % (ref 37–54)
HGB BLD-MCNC: 16 G/DL (ref 12.5–16.5)
IMM GRANULOCYTES # BLD AUTO: <0.03 K/UL (ref 0–0.58)
IMM GRANULOCYTES NFR BLD: 0 % (ref 0–5)
LYMPHOCYTES NFR BLD: 1.72 K/UL (ref 1.5–4)
LYMPHOCYTES RELATIVE PERCENT: 25 % (ref 20–42)
MCH RBC QN AUTO: 31.5 PG (ref 26–35)
MCHC RBC AUTO-ENTMCNC: 33.9 G/DL (ref 32–34.5)
MCV RBC AUTO: 92.9 FL (ref 80–99.9)
MONOCYTES NFR BLD: 0.59 K/UL (ref 0.1–0.95)
MONOCYTES NFR BLD: 9 % (ref 2–12)
NEUTROPHILS NFR BLD: 64 % (ref 43–80)
NEUTS SEG NFR BLD: 4.44 K/UL (ref 1.8–7.3)
PLATELET # BLD AUTO: 315 K/UL (ref 130–450)
PMV BLD AUTO: 8.9 FL (ref 7–12)
POTASSIUM SERPL-SCNC: 5.1 MMOL/L (ref 3.5–5)
PROT SERPL-MCNC: 8.2 G/DL (ref 6.4–8.3)
RBC # BLD AUTO: 5.08 M/UL (ref 3.8–5.8)
SODIUM SERPL-SCNC: 140 MMOL/L (ref 132–146)
TROPONIN I SERPL HS-MCNC: 9 NG/L (ref 0–11)
WBC OTHER # BLD: 6.9 K/UL (ref 4.5–11.5)

## 2024-02-15 PROCEDURE — 93005 ELECTROCARDIOGRAM TRACING: CPT | Performed by: EMERGENCY MEDICINE

## 2024-02-15 PROCEDURE — 93010 ELECTROCARDIOGRAM REPORT: CPT | Performed by: INTERNAL MEDICINE

## 2024-02-15 PROCEDURE — 99285 EMERGENCY DEPT VISIT HI MDM: CPT

## 2024-02-15 PROCEDURE — 96374 THER/PROPH/DIAG INJ IV PUSH: CPT

## 2024-02-15 PROCEDURE — 6360000002 HC RX W HCPCS: Performed by: EMERGENCY MEDICINE

## 2024-02-15 PROCEDURE — 71275 CT ANGIOGRAPHY CHEST: CPT

## 2024-02-15 PROCEDURE — 6360000004 HC RX CONTRAST MEDICATION: Performed by: RADIOLOGY

## 2024-02-15 PROCEDURE — 85025 COMPLETE CBC W/AUTO DIFF WBC: CPT

## 2024-02-15 PROCEDURE — 84484 ASSAY OF TROPONIN QUANT: CPT

## 2024-02-15 PROCEDURE — 80053 COMPREHEN METABOLIC PANEL: CPT

## 2024-02-15 RX ORDER — KETOROLAC TROMETHAMINE 30 MG/ML
30 INJECTION, SOLUTION INTRAMUSCULAR; INTRAVENOUS ONCE
Status: COMPLETED | OUTPATIENT
Start: 2024-02-15 | End: 2024-02-15

## 2024-02-15 RX ADMIN — KETOROLAC TROMETHAMINE 30 MG: 30 INJECTION, SOLUTION INTRAMUSCULAR; INTRAVENOUS at 11:27

## 2024-02-15 RX ADMIN — IOPAMIDOL 75 ML: 755 INJECTION, SOLUTION INTRAVENOUS at 12:50

## 2024-02-15 ASSESSMENT — PAIN SCALES - GENERAL: PAINLEVEL_OUTOF10: 5

## 2024-02-15 ASSESSMENT — PAIN - FUNCTIONAL ASSESSMENT: PAIN_FUNCTIONAL_ASSESSMENT: 0-10

## 2024-02-15 NOTE — ED NOTES
Red sticker placed on chart, [facility] notified, pt placed in intake 2 per [facility]. EKG done an given to Dr. Lovelace

## 2024-02-17 NOTE — ED PROVIDER NOTES
[facility] EMERGENCY DEPARTMENT  EMERGENCY DEPARTMENT ENCOUNTER        Pt Name: Aquiles Suh  MRN: [id number]  Birthdate 1980  Date of evaluation: 2/15/2024  Provider: Estelita Lovelace DO  PCP: Brent Pena DO  Note Started: 7:18 AM EST 2/17/24    CHIEF COMPLAINT       Chief Complaint   Patient presents with    Chest Pain    Shortness of Breath       HISTORY OF PRESENT ILLNESS: 1 or more Elements       Aquiles Suh is a 43 y.o. male who presents to the emergency department the chief complaint of chest pain.  The patient states that he began with chest pain over 3 weeks ago.  Pain has been intermittent it is located mostly in the right side of the chest it is described as sharp and worse with movement as well as inspiration.  Nothing makes it better.  The patient with no treatment prior to arrival.  The patient states he is concerned because he does have a history of pulmonary embolism.  He is not currently on a blood thinners.  He denies any fevers, chills, nausea, vomiting or abdominal pain.  The patient has no history of hypertension, hyperlipidemia or diabetes.  Patient is a former smoker.    Nursing Notes were all reviewed and agreed with or any disagreements were addressed in the HPI.    REVIEW OF SYSTEMS :      Review of Systems   Constitutional:  Negative for fever.   HENT:  Negative for congestion.    Eyes:  Negative for redness.   Respiratory:  Negative for shortness of breath.    Cardiovascular:  Positive for chest pain.   Gastrointestinal:  Negative for abdominal pain, nausea and vomiting.   Genitourinary:  Negative for dysuria.   Musculoskeletal:  Negative for arthralgias.   Skin:  Negative for rash.   Neurological:  Negative for dizziness.   Psychiatric/Behavioral:  Negative for confusion.    All other systems reviewed and are negative.      Positives and Pertinent negatives as per HPI.     SURGICAL HISTORY     Past Surgical History:   Procedure      I utilized an evidence-based risk rating tool (CMT) along with my training and experience to weigh the risk of discharge against the risks of further testing, imaging, or hospitalization. At this time, I estimate the risks of additional testing, imaging, or hospitalization to be equal to or greater than the risk of discharge(in the case of discharge home).      The patient's HEART Score is 2. In rare cases, I give patients with HEART Score of 4 the option of discharge, but only when they meet criteria for \"Low 4,\" meaning that HST was used, and the 4 is not from a highly suspicious story, highly suspicious EKG, or positive cardiac enzymes.  In these selected cases, the risk of a \"Low 4\" is still most likely lower than the risk of admission and further testing/imaging. [id number]    SHARED DECISION MAKING:   I discussed my risk assessment with the patient. The patient understands and consents to the risk of disposition/plan, as well as the risk of uncertainty in estimating outcomes. [id number]          Patient treated with   Medications   ketorolac (TORADOL) injection 30 mg (30 mg IntraVENous Given 2/15/24 1127)   iopamidol (ISOVUE-370) 76 % injection 75 mL (75 mLs IntraVENous Given 2/15/24 1250)         EKG:  This EKG is signed and interpreted by me.    Normal sinus rhythm rate of 95, no ST segment elevation or pression, ME interval 135 MS, QRS 84 MS, QTc 424 MS      Pt will be d/c and will follow up with his PCP . He is educated on signs and symptoms that require emergent evaluation. Pt is advised to return to the ED if his symptoms change or worsen. If his pain persists, pt may need further evaluation. Pt is agreeable to plan and all questions have been answered at this time.      PATIENT REFERRED TO:  Brent Pena DO  [address] [address]  [phone]    Call in 1 day        DISCHARGE MEDICATIONS:  Discharge Medication List as of 2/15/2024  2:04 PM